# Patient Record
Sex: FEMALE | Race: WHITE | Employment: FULL TIME | URBAN - METROPOLITAN AREA
[De-identification: names, ages, dates, MRNs, and addresses within clinical notes are randomized per-mention and may not be internally consistent; named-entity substitution may affect disease eponyms.]

---

## 2019-01-07 ENCOUNTER — HOSPITAL ENCOUNTER (EMERGENCY)
Age: 29
Discharge: HOME | End: 2019-01-07
Attending: EMERGENCY MEDICINE
Payer: COMMERCIAL

## 2019-01-07 VITALS
HEART RATE: 93 BPM | TEMPERATURE: 98.4 F | DIASTOLIC BLOOD PRESSURE: 63 MMHG | HEIGHT: 70 IN | RESPIRATION RATE: 16 BRPM | OXYGEN SATURATION: 99 % | SYSTOLIC BLOOD PRESSURE: 101 MMHG | BODY MASS INDEX: 27.92 KG/M2 | WEIGHT: 195 LBS

## 2019-01-07 DIAGNOSIS — R55 NEAR SYNCOPE: Primary | ICD-10-CM

## 2019-01-07 DIAGNOSIS — Z3A.21 21 WEEKS GESTATION OF PREGNANCY: ICD-10-CM

## 2019-01-07 DIAGNOSIS — N30.00 ACUTE CYSTITIS WITHOUT HEMATURIA: ICD-10-CM

## 2019-01-07 LAB
ANION GAP SERPL CALCULATED.3IONS-SCNC: 11 MMOL/L (ref 7–16)
BACTERIA: ABNORMAL /HPF
BASOPHILS ABSOLUTE: 0.03 E9/L (ref 0–0.2)
BASOPHILS RELATIVE PERCENT: 0.2 % (ref 0–2)
BILIRUBIN URINE: NEGATIVE
BLOOD, URINE: NEGATIVE
BUN BLDV-MCNC: 10 MG/DL (ref 6–20)
CALCIUM SERPL-MCNC: 9.1 MG/DL (ref 8.6–10.2)
CHLORIDE BLD-SCNC: 101 MMOL/L (ref 98–107)
CLARITY: CLEAR
CO2: 23 MMOL/L (ref 22–29)
COLOR: YELLOW
CREAT SERPL-MCNC: 0.6 MG/DL (ref 0.5–1)
EKG ATRIAL RATE: 90 BPM
EKG P AXIS: 44 DEGREES
EKG P-R INTERVAL: 140 MS
EKG Q-T INTERVAL: 352 MS
EKG QRS DURATION: 80 MS
EKG QTC CALCULATION (BAZETT): 430 MS
EKG R AXIS: 47 DEGREES
EKG T AXIS: 31 DEGREES
EKG VENTRICULAR RATE: 90 BPM
EOSINOPHILS ABSOLUTE: 0.07 E9/L (ref 0.05–0.5)
EOSINOPHILS RELATIVE PERCENT: 0.6 % (ref 0–6)
EPITHELIAL CELLS, UA: ABNORMAL /HPF
GFR AFRICAN AMERICAN: >60
GFR NON-AFRICAN AMERICAN: >60 ML/MIN/1.73
GLUCOSE BLD-MCNC: 86 MG/DL (ref 74–99)
GLUCOSE URINE: NEGATIVE MG/DL
HCT VFR BLD CALC: 35.9 % (ref 34–48)
HEMOGLOBIN: 11.6 G/DL (ref 11.5–15.5)
IMMATURE GRANULOCYTES #: 0.19 E9/L
IMMATURE GRANULOCYTES %: 1.6 % (ref 0–5)
KETONES, URINE: NEGATIVE MG/DL
LACTIC ACID: 0.9 MMOL/L (ref 0.5–2.2)
LEUKOCYTE ESTERASE, URINE: ABNORMAL
LYMPHOCYTES ABSOLUTE: 1.66 E9/L (ref 1.5–4)
LYMPHOCYTES RELATIVE PERCENT: 13.6 % (ref 20–42)
MCH RBC QN AUTO: 30.1 PG (ref 26–35)
MCHC RBC AUTO-ENTMCNC: 32.3 % (ref 32–34.5)
MCV RBC AUTO: 93 FL (ref 80–99.9)
MONOCYTES ABSOLUTE: 0.69 E9/L (ref 0.1–0.95)
MONOCYTES RELATIVE PERCENT: 5.6 % (ref 2–12)
NEUTROPHILS ABSOLUTE: 9.61 E9/L (ref 1.8–7.3)
NEUTROPHILS RELATIVE PERCENT: 78.4 % (ref 43–80)
NITRITE, URINE: NEGATIVE
PDW BLD-RTO: 12 FL (ref 11.5–15)
PH UA: 7 (ref 5–9)
PLATELET # BLD: 247 E9/L (ref 130–450)
PMV BLD AUTO: 8.9 FL (ref 7–12)
POTASSIUM SERPL-SCNC: 4.5 MMOL/L (ref 3.5–5)
PROTEIN UA: NEGATIVE MG/DL
RBC # BLD: 3.86 E12/L (ref 3.5–5.5)
RBC UA: ABNORMAL /HPF (ref 0–2)
SODIUM BLD-SCNC: 135 MMOL/L (ref 132–146)
SPECIFIC GRAVITY UA: <=1.005 (ref 1–1.03)
UROBILINOGEN, URINE: 0.2 E.U./DL
WBC # BLD: 12.3 E9/L (ref 4.5–11.5)
WBC UA: ABNORMAL /HPF (ref 0–5)

## 2019-01-07 PROCEDURE — 99284 EMERGENCY DEPT VISIT MOD MDM: CPT

## 2019-01-07 PROCEDURE — 85025 COMPLETE CBC W/AUTO DIFF WBC: CPT

## 2019-01-07 PROCEDURE — 93005 ELECTROCARDIOGRAM TRACING: CPT | Performed by: PHYSICIAN ASSISTANT

## 2019-01-07 PROCEDURE — 80048 BASIC METABOLIC PNL TOTAL CA: CPT

## 2019-01-07 PROCEDURE — 83605 ASSAY OF LACTIC ACID: CPT

## 2019-01-07 PROCEDURE — 81001 URINALYSIS AUTO W/SCOPE: CPT

## 2019-01-07 PROCEDURE — 2580000003 HC RX 258: Performed by: PHYSICIAN ASSISTANT

## 2019-01-07 RX ORDER — CEFDINIR 300 MG/1
300 CAPSULE ORAL 2 TIMES DAILY
Qty: 14 CAPSULE | Refills: 0 | Status: SHIPPED | OUTPATIENT
Start: 2019-01-07 | End: 2019-01-14

## 2019-01-07 RX ORDER — 0.9 % SODIUM CHLORIDE 0.9 %
1000 INTRAVENOUS SOLUTION INTRAVENOUS ONCE
Status: COMPLETED | OUTPATIENT
Start: 2019-01-07 | End: 2019-01-07

## 2019-01-07 RX ORDER — MECLIZINE HYDROCHLORIDE 25 MG/1
25 TABLET ORAL 3 TIMES DAILY PRN
Qty: 30 TABLET | Refills: 0 | Status: SHIPPED | OUTPATIENT
Start: 2019-01-07 | End: 2019-01-17

## 2019-01-07 RX ADMIN — SODIUM CHLORIDE 1000 ML: 9 INJECTION, SOLUTION INTRAVENOUS at 11:08

## 2019-04-24 ENCOUNTER — HOSPITAL ENCOUNTER (INPATIENT)
Age: 29
LOS: 2 days | Discharge: HOME | DRG: 832 | End: 2019-04-26
Attending: SPECIALIST | Admitting: SPECIALIST
Payer: COMMERCIAL

## 2019-04-24 ENCOUNTER — APPOINTMENT (OUTPATIENT)
Dept: ULTRASOUND IMAGING | Age: 29
DRG: 832 | End: 2019-04-24
Payer: COMMERCIAL

## 2019-04-24 PROBLEM — O21.9 VOMITING AFFECTING PREGNANCY, ANTEPARTUM: Status: ACTIVE | Noted: 2019-04-24

## 2019-04-24 LAB
ALBUMIN SERPL-MCNC: 3.6 G/DL (ref 3.5–5.2)
ALP BLD-CCNC: 102 U/L (ref 35–104)
ALT SERPL-CCNC: 13 U/L (ref 0–32)
AMNISURE PATIENT RESULT: NORMAL
AMORPHOUS: ABNORMAL
ANION GAP SERPL CALCULATED.3IONS-SCNC: 12 MMOL/L (ref 7–16)
AST SERPL-CCNC: 23 U/L (ref 0–31)
BACTERIA: ABNORMAL /HPF
BACTERIA: ABNORMAL /HPF
BILIRUB SERPL-MCNC: <0.2 MG/DL (ref 0–1.2)
BILIRUBIN URINE: NEGATIVE
BILIRUBIN URINE: NEGATIVE
BLOOD, URINE: ABNORMAL
BLOOD, URINE: NEGATIVE
BUN BLDV-MCNC: 9 MG/DL (ref 6–20)
CALCIUM SERPL-MCNC: 8.9 MG/DL (ref 8.6–10.2)
CHLORIDE BLD-SCNC: 102 MMOL/L (ref 98–107)
CLARITY: ABNORMAL
CLARITY: ABNORMAL
CO2: 22 MMOL/L (ref 22–29)
COLOR: YELLOW
COLOR: YELLOW
CREAT SERPL-MCNC: 0.6 MG/DL (ref 0.5–1)
EPITHELIAL CELLS, UA: ABNORMAL /HPF
EPITHELIAL CELLS, UA: ABNORMAL /HPF
GFR AFRICAN AMERICAN: >60
GFR NON-AFRICAN AMERICAN: >60 ML/MIN/1.73
GLUCOSE BLD-MCNC: 89 MG/DL (ref 74–99)
GLUCOSE URINE: NEGATIVE MG/DL
GLUCOSE URINE: NEGATIVE MG/DL
HCT VFR BLD CALC: 33.7 % (ref 34–48)
HEMOGLOBIN: 11.4 G/DL (ref 11.5–15.5)
KETONES, URINE: ABNORMAL MG/DL
KETONES, URINE: NEGATIVE MG/DL
LEUKOCYTE ESTERASE, URINE: ABNORMAL
LEUKOCYTE ESTERASE, URINE: NEGATIVE
MCH RBC QN AUTO: 31.1 PG (ref 26–35)
MCHC RBC AUTO-ENTMCNC: 33.8 % (ref 32–34.5)
MCV RBC AUTO: 92.1 FL (ref 80–99.9)
MUCUS: PRESENT
MUCUS: PRESENT
NITRITE, URINE: NEGATIVE
NITRITE, URINE: NEGATIVE
PDW BLD-RTO: 12 FL (ref 11.5–15)
PH UA: 6.5 (ref 5–9)
PH UA: 7.5 (ref 5–9)
PLATELET # BLD: 200 E9/L (ref 130–450)
PMV BLD AUTO: 9.5 FL (ref 7–12)
POTASSIUM SERPL-SCNC: 3.8 MMOL/L (ref 3.5–5)
PROTEIN UA: NEGATIVE MG/DL
PROTEIN UA: NEGATIVE MG/DL
RBC # BLD: 3.66 E12/L (ref 3.5–5.5)
RBC UA: >20 /HPF (ref 0–2)
RBC UA: ABNORMAL /HPF (ref 0–2)
RENAL EPITHELIAL, UA: ABNORMAL /HPF
SODIUM BLD-SCNC: 136 MMOL/L (ref 132–146)
SPECIFIC GRAVITY UA: 1.01 (ref 1–1.03)
SPECIFIC GRAVITY UA: 1.02 (ref 1–1.03)
TOTAL PROTEIN: 6.9 G/DL (ref 6.4–8.3)
UROBILINOGEN, URINE: 0.2 E.U./DL
UROBILINOGEN, URINE: 0.2 E.U./DL
WBC # BLD: 15.2 E9/L (ref 4.5–11.5)
WBC UA: ABNORMAL /HPF (ref 0–5)
WBC UA: ABNORMAL /HPF (ref 0–5)

## 2019-04-24 PROCEDURE — 6360000002 HC RX W HCPCS: Performed by: SPECIALIST

## 2019-04-24 PROCEDURE — 87186 SC STD MICRODIL/AGAR DIL: CPT

## 2019-04-24 PROCEDURE — 85027 COMPLETE CBC AUTOMATED: CPT

## 2019-04-24 PROCEDURE — 2580000003 HC RX 258: Performed by: NURSE PRACTITIONER

## 2019-04-24 PROCEDURE — 87077 CULTURE AEROBIC IDENTIFY: CPT

## 2019-04-24 PROCEDURE — 36415 COLL VENOUS BLD VENIPUNCTURE: CPT

## 2019-04-24 PROCEDURE — 99213 OFFICE O/P EST LOW 20 MIN: CPT | Performed by: NURSE PRACTITIONER

## 2019-04-24 PROCEDURE — 2580000003 HC RX 258: Performed by: SPECIALIST

## 2019-04-24 PROCEDURE — 87088 URINE BACTERIA CULTURE: CPT

## 2019-04-24 PROCEDURE — 80053 COMPREHEN METABOLIC PANEL: CPT

## 2019-04-24 PROCEDURE — 76770 US EXAM ABDO BACK WALL COMP: CPT

## 2019-04-24 PROCEDURE — 51701 INSERT BLADDER CATHETER: CPT

## 2019-04-24 PROCEDURE — 81001 URINALYSIS AUTO W/SCOPE: CPT

## 2019-04-24 RX ORDER — SODIUM CHLORIDE, SODIUM LACTATE, POTASSIUM CHLORIDE, AND CALCIUM CHLORIDE .6; .31; .03; .02 G/100ML; G/100ML; G/100ML; G/100ML
500 INJECTION, SOLUTION INTRAVENOUS ONCE
Status: COMPLETED | OUTPATIENT
Start: 2019-04-24 | End: 2019-04-24

## 2019-04-24 RX ORDER — SODIUM CHLORIDE, SODIUM LACTATE, POTASSIUM CHLORIDE, CALCIUM CHLORIDE 600; 310; 30; 20 MG/100ML; MG/100ML; MG/100ML; MG/100ML
INJECTION, SOLUTION INTRAVENOUS CONTINUOUS
Status: DISCONTINUED | OUTPATIENT
Start: 2019-04-24 | End: 2019-04-26 | Stop reason: HOSPADM

## 2019-04-24 RX ADMIN — SODIUM CHLORIDE, POTASSIUM CHLORIDE, SODIUM LACTATE AND CALCIUM CHLORIDE 500 ML: 600; 310; 30; 20 INJECTION, SOLUTION INTRAVENOUS at 14:55

## 2019-04-24 RX ADMIN — SODIUM CHLORIDE, POTASSIUM CHLORIDE, SODIUM LACTATE AND CALCIUM CHLORIDE: 600; 310; 30; 20 INJECTION, SOLUTION INTRAVENOUS at 19:05

## 2019-04-24 RX ADMIN — CEFTRIAXONE 1 G: 1 INJECTION, POWDER, FOR SOLUTION INTRAMUSCULAR; INTRAVENOUS at 21:53

## 2019-04-24 NOTE — PROGRESS NOTES
Helped patient up to restroom. Blood tinged on toilet tissue and in urine. Gave patient pad to monitor if bleeding continues from VE.

## 2019-04-24 NOTE — PROGRESS NOTES
Dr. Sanchez Sanford notified pt wants to eat, and notified that the ultrasound of the kidneys is still not back, new orders obtained

## 2019-04-24 NOTE — H&P
Department of Obstetrics and Gynecology  Labor and Delivery  Triage Note      SUBJECTIVE:  Roman Arriaza is a 34 y.o. female, , No LMP recorded. Patient is pregnant., Estimated Date of Delivery: 19, 36w2d, here with the complaint of decreased fm, lof, n/v since this morning. Pt states she had an appointment with dr Belkis carballo this afternoon or an NST which was reactive. Pt reports an episode of emesis every time she eats something. She has been able to keep water down. She reports mid-lower back pain. Denies dysuria, frequency, or urgency. Pt denies constipation.       Prenatal course: threatened  labor at 27 weeks    Review of Systems:   Ears, nose, mouth, throat, and face: negative  Respiratory: negative  Cardiovascular: negative  Gastrointestinal: positive for abdominal pain and vomiting  Genitourinary:positive for cva tenderness,    Integument/breast: negative  Hematologic/lymphatic: negative  Musculoskeletal:negative  Neurological: negative  Behavioral/Psych: negative  Endocrine: negative  Allergic/Immunologic: negative    OBJECTIVE    Vitals:  /74   Pulse 97   Temp 98.7 °F (37.1 °C) (Oral)   Resp 16   Ht 5' 10\" (1.778 m)   Wt 233 lb (105.7 kg)   BMI 33.43 kg/m²     General- alert, NAD  Skin- warm and dry, no rashes seen  Psych- normal mood and affect  Neuro- CN II-XII grossly intact  Abdomen: soft, nontender  Musculoskeletal-cva tenderness, low-mid back pain    Vaginal Exam:  Cervical dilatation: 1cm, Effacement: 60%, Station: -2, Presentation: vtx, Membranes: intact    Fetal heart rate:         Baseline Heart Rate:  140        Accelerations:  present       Decelerations:  absent       Variability:  moderate    Contraction frequency: none    ASSESSMENT:      36.2 weeks  N/v  Back pain  amnisure negative    Plan: d/w Dr. Karlene Good  Fetal monitoring  Urinalysis  Hydrate  Cbc   cmp

## 2019-04-25 PROBLEM — O23.03 PYELONEPHRITIS AFFECTING PREGNANCY IN THIRD TRIMESTER: Status: ACTIVE | Noted: 2019-04-25

## 2019-04-25 PROBLEM — R07.9 CHEST PAIN: Status: ACTIVE | Noted: 2019-04-25

## 2019-04-25 PROBLEM — M54.9 BACK PAIN: Status: ACTIVE | Noted: 2019-04-25

## 2019-04-25 LAB
D DIMER: 225 NG/ML DDU
TROPONIN: <0.01 NG/ML (ref 0–0.03)

## 2019-04-25 PROCEDURE — 6360000002 HC RX W HCPCS: Performed by: SPECIALIST

## 2019-04-25 PROCEDURE — 84484 ASSAY OF TROPONIN QUANT: CPT

## 2019-04-25 PROCEDURE — 85378 FIBRIN DEGRADE SEMIQUANT: CPT

## 2019-04-25 PROCEDURE — 2580000003 HC RX 258: Performed by: SPECIALIST

## 2019-04-25 PROCEDURE — 36415 COLL VENOUS BLD VENIPUNCTURE: CPT

## 2019-04-25 PROCEDURE — 99254 IP/OBS CNSLTJ NEW/EST MOD 60: CPT | Performed by: INTERNAL MEDICINE

## 2019-04-25 PROCEDURE — 2580000003 HC RX 258: Performed by: NURSE PRACTITIONER

## 2019-04-25 PROCEDURE — 93005 ELECTROCARDIOGRAM TRACING: CPT | Performed by: INTERNAL MEDICINE

## 2019-04-25 PROCEDURE — 1220000000 HC SEMI PRIVATE OB R&B

## 2019-04-25 RX ORDER — CEFTRIAXONE 1 G/1
INJECTION, POWDER, FOR SOLUTION INTRAMUSCULAR; INTRAVENOUS
Status: DISCONTINUED
Start: 2019-04-25 | End: 2019-04-25 | Stop reason: WASHOUT

## 2019-04-25 RX ADMIN — SODIUM CHLORIDE, POTASSIUM CHLORIDE, SODIUM LACTATE AND CALCIUM CHLORIDE: 600; 310; 30; 20 INJECTION, SOLUTION INTRAVENOUS at 11:37

## 2019-04-25 RX ADMIN — SODIUM CHLORIDE, POTASSIUM CHLORIDE, SODIUM LACTATE AND CALCIUM CHLORIDE: 600; 310; 30; 20 INJECTION, SOLUTION INTRAVENOUS at 19:20

## 2019-04-25 RX ADMIN — CEFTRIAXONE 1 G: 1 INJECTION, POWDER, FOR SOLUTION INTRAMUSCULAR; INTRAVENOUS at 21:39

## 2019-04-25 NOTE — PROGRESS NOTES
Patient still c/o abdominal cramping and back pain. VE unchanged and no blood on glove. Will update doctor.

## 2019-04-25 NOTE — PROGRESS NOTES
Jeff Woods notified of urology recommendations, states she will stay and get another antibiotic tonight with possible discharge after second dose of antibiotic

## 2019-04-25 NOTE — PROGRESS NOTES
Dr Elana Nelson updated on straight cath results. Orders to give rocephin now, (even with allergy of hives to pcn) and strain urine.   Will re-evaluate in AM.

## 2019-04-25 NOTE — PROGRESS NOTES
Dr. Mark Orr notified of new consult, no new orders obtained and states he will be up to evaluate the pt

## 2019-04-25 NOTE — CONSULTS
4/25/2019 7:13 AM  Service: Urology  Group: [email or web address] ([facility])    Graciela Serrano  [id number]     Chief Complaint:    Bilateral back pain    History of Present Illness: The patient is a 34 y.o. female patient whoavida 1 para 0 abortus 0. Her estimated date of delivery is 5-. She had an episode of emesis and some mid lower back pain. She is afebrile. Her BUN is 9 and creatinine is 0.6. Her white count is 15,200. Her urine shows a large amount of blood. Her renal ultrasound is normal showing no hydronephrosis not even hydronephrosis of pregnancy. .  When she was a child she had a dilatation of her bladder which resolved some lower urinary tract symptoms that she had. She's had no recent problems until last week she was treated for urinary tract infection. She presented with the right flank but also iliosacral pain and had 1 episode of emesis without associated nausea. There was no fever. .  She is a nonsmoker is on no diuretics. A catheterized specimen did show her to have microhematuria. She is not had gross hematuria. There is no family history of calculus disease. Up to this point to her pregnancy has been uncomplicated      Past Medical History:   Diagnosis Date    Abdominal pain     Anxiety     Esophageal spasm     Genital herpes 12/17/2014    as of 11/25/2015 / no active breakout    GERD (gastroesophageal reflux disease)     History of PCOS     Hypogonadotropic hypogonadism syndrome, female ([facility] [address] 12/17/2014    Exercise induced. Past Surgical History:   Procedure Laterality Date    BLADDER SURGERY      ENDOSCOPY, COLON, DIAGNOSTIC  11/25/2015       Medications Prior to Admission:    Medications Prior to Admission: Prenatal Vit w/Gr-Wihcsnybz-LS (PNV PO), Take by mouth  valACYclovir (VALTREX) 500 MG tablet, Take 500 mg by mouth daily as needed. Allergies:    Food;  Bactrim [sulfamethoxazole-trimethoprim]; Pcn [penicillins]; and Doxycycline    Social History:    reports that she has never smoked. She has never used smokeless tobacco. She reports that she does not drink alcohol or use drugs. She lives at home with her . Family History:   Non-contributory to this Urological problem  family history is not on file. Review of Systems:  Respiratory: negative for cough and hemoptysis  Cardiovascular: negative for chest pain and dyspnea  Gastrointestinal: negative for abdominal pain, diarrhea, nausea and vomiting  Derm: negative for rash and skin lesion(s)  Neurological: negative for seizures and tremors  Endocrine: negative for diabetic symptoms including polydipsia and polyuria  : As above in the HPI, otherwise negative  All other reviews are negative    Physical Exam:     Vitals:  /63   Pulse 82   Temp 98.3 °F (36.8 °C) (Oral)   Resp 16   Ht 5' 10\" (1.778 m)   Wt 233 lb (105.7 kg)   BMI 33.43 kg/m²     General:  Awake, alert, oriented X 3. Well developed, well nourished, well groomed. No apparent distress. HEENT:  Normocephalic, atraumatic. Pupils equal, round. No scleral icterus. No conjunctival injection. Normal lips, teeth, and gums. No nasal discharge. Neck:  Supple, no masses. Heart:  RRR  Lungs:  No audible wheezing. Respirations symmetric and non-labored. Abdomen:  soft, nontender, no masses, no organomegaly, no peritoneal signs  Extremities:  No clubbing, cyanosis, or edema  Skin:  Warm and dry, no open lesions or rashes  Neuro: There are no motor or sensory deficits in the 4 quadrant extremities   Rectal: deferred  Genitalia:      Labs:     Recent Labs     04/24/19  1450   WBC 15.2*   RBC 3.66   HGB 11.4*   HCT 33.7*   MCV 92.1   MCH 31.1   MCHC 33.8   RDW 12.0      MPV 9.5         Recent Labs     04/24/19  1450   CREATININE 0.6         Assessment:  Alix Bobbi 34 y.o. female     Her renal ultrasound is normal.  Her microhematuria could be a remnant of her recent urinary tract infection.   She is comfortable at the moment she could be discharged from our standpoint followed in our office in the course of the next several weeks. I would ask her to see nurse practitioner Kalie Morrissey at her next office visit subsequently she'll be seen by one of our physicians. Plan:  If she has interval fever or worsening of her flank pain she may need reassessed at present will I would like to avoid cystoscopy and stent insertion. Metabolic evaluation may be necessary if we determine that she's had a stone.   I would like further imaging of her when her pregnancy is completed including perhaps a CAT scan of abdomen and pelvis without contrast masses sure there is no calculus disease  Cystoscopy may be necessary in the future particularly if she has persistent microhematuria    Electronically signed by Misael Cali MD on 4/25/2019 at 7:13 AM

## 2019-04-25 NOTE — PROGRESS NOTES
Pt called out complaining of CP. Pt states it is on the right side and wraps around her chest to her back. Dr. Yap Gravely happened to be on the unit and new orders obtained.   Vitals stable

## 2019-04-25 NOTE — PROGRESS NOTES
Dr Oquendo Public updated on patient VE, spotting, ctxs, continues back pain and abd cramping, Ultrasound WNL, UA abnormal.  Orders for Rocephin, try to obtain straight cath or consult nephrology in AM.

## 2019-04-25 NOTE — CONSULTS
[facility] IM consult        Chief Complaint:  R ant Chest resolved now R mid back pain  History of Present Illness   The patient is a 34 y.o. female    First pregnacy -- date of delivery 5/20/19  S/p recurrent emesis, midlow back pain, inc WBC, suspected UTI. Pt reports an episode of emesis every time she eats something. She has been able to keep water down. She reports mid-lower back pain. Denies dysuria, frequency, or urgency. Seen by renal for-- hematuria on urine  \"Her renal ultrasound is normal.  Her microhematuria could be a remnant of her recent urinary tract infection. \"    Back pain over days   theon ONE episode few mintues She reportedly had R sternal chest pain, earlier today-- no new sob   HR [address] -- for this stage pregnancy  No wheezing, no new cough, or chest tigthness  Not pleuritic, no external, no hx of angina, no chf symptoms  No changed by chest wall motion, breathing, arm motion-- unable to reproduce chest pain now  Hx of gerds,but she feels this is different    - hx taken from the   REVIEW OF SYSTEMS:  no fevers, chills, cp, sob, n/v, ha, vision/hearing changes, wt changes, hot/cold flashes, other open skin lesions, diarrhea, constipation, dysuria/hematuria unless noted in HPI. Complete ROS performed with the patient and is otherwise negative. Past Medical History:      Diagnosis Date    Abdominal pain     Anxiety     Esophageal spasm     Genital herpes 12/17/2014    as of 11/25/2015 / no active breakout    GERD (gastroesophageal reflux disease)     History of PCOS     Hypogonadotropic hypogonadism syndrome, female ([facility] [address] 12/17/2014    Exercise induced. Past Surgical History:        Procedure Laterality Date    BLADDER SURGERY      ENDOSCOPY, COLON, DIAGNOSTIC  11/25/2015       Home Medications:  Prior to Admission medications    Medication Sig Start Date End Date Taking?  Authorizing Provider   Prenatal Vit w/Gi-Jrdlrlptb-TI (PNV PO) Take by mouth   Yes Historical Provider, MD   valACYclovir (VALTREX) 500 MG tablet Take 500 mg by mouth daily as needed. Historical Provider, MD       Allergies:  Food; Bactrim [sulfamethoxazole-trimethoprim]; Pcn [penicillins]; and Doxycycline    Social History:   TOBACCO:   reports that she has never smoked. She has never used smokeless tobacco.  ETOH:   reports that she does not drink alcohol. Family History:   History reviewed. No pertinent family history. Or deferred/otherwise considered non contributory to current admission  PHYSICAL EXAM:    VS: /72   Pulse 85   Temp 97.9 °F (36.6 °C) (Oral)   Resp 16   Ht 5' 10\" (1.778 m)   Wt 233 lb (105.7 kg)   SpO2 99%   BMI 33.43 kg/m²     General Appearance:     no acute distress. Psych:  HEENT:    A.O. As per HPI details  NC/AT, PERRL, no pallor no icterus, lips/ext mucous membrane grossly N    Neck:   Supple, trachea midline, no obvious JVD   Resp:     CTAB, No wheezes, No rhonchi   Chest wall:    No tenderness or deformity   Heart:    Regular rate and rhythm, S1 and S2 normal, no rub or gallop. Abdomen:     Soft, non-tender, bowel sounds active    Large stomach consistent with 37 week+pregnancy    Genitalia & Rectal:    Deferred.    Extremities x4:   Extremities normal, atraumatic, no cyanosis, no clubbing   Musculoskeletal:      NO active synovitis or swollen b/l wrists, 2-5 MCPs examined   Skin:   Skin color, texture, turgor fairly normal, no ACUTE rashes or lesions in lower legs and arms examined   Lymph nodes:   Cervical nodes grossly normal   Neurologic:  .Grossly symmetric  strength in UEs and LEs with symmetric grossly intact to light touch sensation     LABS:  CBC:   Lab Results   Component Value Date    WBC 15.2 04/24/2019    RBC 3.66 04/24/2019    HGB 11.4 04/24/2019    HCT 33.7 04/24/2019     04/24/2019    MCV 92.1 04/24/2019     BMP:    Lab Results   Component Value Date     04/24/2019    K 3.8 04/24/2019     04/24/2019 CO2 22 2019    BUN 9 2019    CREATININE 0.6 2019    GLUCOSE 89 2019    CALCIUM 8.9 2019     Hepatic Function Panel:    Lab Results   Component Value Date    ALKPHOS 102 2019    AST 23 2019    ALT 13 2019    PROT 6.9 2019    LABALBU 3.6 2019    BILITOT <0.2 2019     Magnesium:  No results found for: MG    PT/INR:  No results found for: PROTIME, INR  U/A:   Lab Results   Component Value Date    LEUKOCYTESUR Negative 2019    PHUR 6.5 2019    WBCUA 2-5 2019    RBCUA >20 2019    BACTERIA MODERATE 2019    SPECGRAV 1.020 2019    BLOODU LARGE 2019    GLUCOSEU Negative 2019     ABG:  No results found for: PHART, ZHK0CEO, PO2ART, N2PTEFHF, YXQ1FEN, BEART  TSH:  No results found for: TSH  Cardiac Enzymes: No results found for: CKTOTAL, CKMB, CKMBINDEX, TROPONINI    Radiology: Us Retroperitoneal Complete    Result Date: 2019  Patient MRN:  [id number] : 1990 Age: 34 years Gender: Female Order Date:  2019 4:45 PM EXAM: US RETROPERITONEAL COMPLETE NUMBER OF IMAGES:  40 INDICATION:  cva tenderness  COMPARISON: None TECHNIQUE: Real-time ultrasound examination of  the kidneys and urinary bladder was performed. Gated and color Doppler techniques were used were appropriate. FINDINGS: The patient is gravid with a third trimester fetus in cephalic presentation. The kidneys are normal in size and texture. The right kidney measures 9.7 x 4.0 x 5.0 cm and the left kidney measures 11.2 x 3.4 x 5.7 cm. No renal masses, hydronephrosis or shadowing calculi are seen. No perinephric fluid collections are identified. The urinary bladder is decompressed and not clearly visualized. CONCLUSION: 1. Normal renal ultrasound with no evidence of hydronephrosis. 2. Third trimester intrauterine gestation seen in cephalic presentation.        EKG:  Done at my request -- normal   Assessment & Plan   ACTIVE hospital problems being addressed/reassessed for this admission:  Active Problems:    Vomiting affecting pregnancy, antepartum    Pyelonephritis affecting pregnancy in third trimester  Resolved Problems:    * No resolved hospital problems. *    Code status/DVT prophylaxis and PLAN --see orders   Note extensive time spent coordinating care between ER docs, ER and floor nurses, and transitioning care over to day providers  Plan of care/ clinical impressions/communication specifics detailed below:    R ant Chest resolved now R mid back pain   34 y.o. female    First pregnacy -- date of delivery 5/20/19  S/p recurrent emesis, midlow back pain, inc WBC, suspected UTI. Pt reports an episode of emesis every time she eats something. She has been able to keep water down. She reports mid-lower back pain. Denies dysuria, frequency, or urgency. Seen by renal for-- hematuria on urine  \"Her renal ultrasound is normal.  Her microhematuria could be a remnant of her recent urinary tract infection. \"    Back pain over days --not tearing like aortic dissection  theon ONE episode few mintues She reportedly had R sternal chest pain, earlier today-- no new sob   HR [address] -- for this stage pregnancy  No wheezing, no new cough, or chest tigthness  Not pleuritic, no external, no hx of angina, no chf symptoms  No changed by chest wall motion, breathing, arm motion-- unable to reproduce chest pain now  Hx of gerds,but she feels this is different      R chest wall pain, NOT reproducible with chest wall maneuvers. ekg ordered by me-- reviewed N- doubt anginal  I requested trop --addendum trop N  ddimer <300, not suspicious for PE or acute dissection    Back pain, no major costovertebral angle tenderness- if previously assoc with pyelo/UTI-- now improved s/p dose abx earlier- if related to vomiting and wretching back/chest wall--now seemingly improving  No recurrence of chest pain, resolved  IM consult sign off.      Yony Cintron MD   Night Officer, overnight admitting doctor at [facility] call day time doctor   for questions after 7:30am    Covering for [address] [facility]  If Qs please call [phone]  Electronically signed by Cindy Rodriguez MD on 4/25/2019 at 7:26 PM

## 2019-04-26 VITALS
RESPIRATION RATE: 15 BRPM | HEIGHT: 70 IN | DIASTOLIC BLOOD PRESSURE: 67 MMHG | WEIGHT: 233 LBS | SYSTOLIC BLOOD PRESSURE: 116 MMHG | TEMPERATURE: 98 F | OXYGEN SATURATION: 99 % | HEART RATE: 80 BPM | BODY MASS INDEX: 33.36 KG/M2

## 2019-04-26 LAB
LV EF: 65 %
LVEF MODALITY: NORMAL

## 2019-04-26 PROCEDURE — 93306 TTE W/DOPPLER COMPLETE: CPT

## 2019-04-26 RX ORDER — CEFUROXIME AXETIL 250 MG/1
250 TABLET ORAL 2 TIMES DAILY
Qty: 20 TABLET | Refills: 0 | Status: SHIPPED | OUTPATIENT
Start: 2019-04-26 | End: 2019-05-06

## 2019-04-26 NOTE — PROGRESS NOTES
Patient feeling better today. Seen by pulmonology, and diagnosed with costochondritis.   Abdomen: Soft, nontender  Uterus: Soft, nontender, gravid  Back: no CVA tenderness  Extremities: Nontender, no Homans    Assessment:  1-pyelonephritis  2-right-sided chest pain-costochondritis    Plan:  1-discharge to home with oral antibiotics

## 2019-04-26 NOTE — PROGRESS NOTES
Dr. Farshad Ferguson called and notified of D-dimer and troponin results. Stated he didn't feel it was suspicious and woudn't hold up any discharge. Have pt follow up with next appt.

## 2019-04-26 NOTE — PROGRESS NOTES
Dr. Danette Garcia notified of pt status and that Dr. Álvaro León would not hold up any discharge. . Stated he would be in to see her.

## 2019-04-26 NOTE — PROGRESS NOTES
Patient complaining of pain in her right chest radiating around to her back ×3 hours. She was evaluated by medicine who did not feel it was cardiac in nature. She denies shortness of breath, cough, or other complaints. She does admit to improvement in her CVA tenderness.   Vital signs stable, afebrile  Abdomen: Soft, nontender  Back: No CVA tenderness  Extremities: Nontender, no Homans    Assessment:  1-pyelonephritis  2-right-sided chest pain    Plan:  1-continue antibiotics  2-medicine consult completed  3-pulmonology consult

## 2019-04-26 NOTE — PROGRESS NOTES
Dr Aguirre Res called, stating Dr Angel Espinoza to see pt today.   Dr Angel Espinoza placed on patients treatment team.. Patient taken to echo via cart

## 2019-04-26 NOTE — PROGRESS NOTES
Patient denies any LOF, Vag bleeding, denies chest pain SPO2 99% on RA. Patient denies any pain at all. Call light in reach, patient understands if anything changes to let her nurse know.

## 2019-04-26 NOTE — CONSULTS
cyanosis. No edema  Neuro: Awake. Follows commands. Positive pupils/gag/corneals. Normal pain response. Lab Results:  CBC:   Recent Labs     04/24/19  1450   WBC 15.2*   HGB 11.4*   HCT 33.7*   MCV 92.1          BMP:  Recent Labs     04/24/19  1450      K 3.8      CO2 22   BUN 9   CREATININE 0.6    ALB:3,BILIDIR:3,BILITOT:3,ALKPHOS:3)@    PT/INR: No results for input(s): PROTIME, INR in the last 72 hours. Cultures:  Sputum: not available  Blood: not available    ABG:   No results for input(s): PH, PO2, PCO2, HCO3, BE, O2SAT, METHB, O2HB, COHB, O2CON, HHB, THB in the last 72 hours. Films:     US RETROPERITONEAL COMPLETE   Final Result      . Assessment:  1. Chest pain: musculoskeletal and reproducible. Pulmonary embolism is effectively exclueded with a d-dimer of 225      Plan:  1. Ok to discharge      Thanks for letting us see this patient in consultation. Please contact us with any questions. Office [phone] or after hours through [employer], x [phone]. Please note that voice recognition technology was used in the preparation of this note and make therefore it may contain inadvertent transcription errors    Vida Arellano M.D., F.C.C.P.     [facility], [address] [place] [address], [address] [place] [address]

## 2019-04-27 LAB — URINE CULTURE, ROUTINE: NORMAL

## 2019-04-28 LAB
EKG ATRIAL RATE: 83 BPM
EKG P AXIS: 23 DEGREES
EKG P-R INTERVAL: 132 MS
EKG Q-T INTERVAL: 354 MS
EKG QRS DURATION: 78 MS
EKG QTC CALCULATION (BAZETT): 415 MS
EKG R AXIS: 41 DEGREES
EKG T AXIS: 15 DEGREES
EKG VENTRICULAR RATE: 83 BPM

## 2019-04-28 PROCEDURE — 93010 ELECTROCARDIOGRAM REPORT: CPT | Performed by: INTERNAL MEDICINE

## 2019-04-29 ENCOUNTER — HOSPITAL ENCOUNTER (INPATIENT)
Age: 29
LOS: 4 days | Discharge: HOME | End: 2019-05-03
Attending: SPECIALIST | Admitting: SPECIALIST
Payer: COMMERCIAL

## 2019-04-29 PROBLEM — O14.93 PREECLAMPSIA, THIRD TRIMESTER: Status: ACTIVE | Noted: 2019-04-29

## 2019-04-29 PROBLEM — Z3A.37 37 WEEKS GESTATION OF PREGNANCY: Status: ACTIVE | Noted: 2019-04-29

## 2019-04-29 LAB
ALBUMIN SERPL-MCNC: 3.6 G/DL (ref 3.5–5.2)
ALP BLD-CCNC: 107 U/L (ref 35–104)
ALT SERPL-CCNC: 13 U/L (ref 0–32)
AMPHETAMINE SCREEN, URINE: NOT DETECTED
ANION GAP SERPL CALCULATED.3IONS-SCNC: 12 MMOL/L (ref 7–16)
AST SERPL-CCNC: 20 U/L (ref 0–31)
BACTERIA: NORMAL /HPF
BARBITURATE SCREEN URINE: NOT DETECTED
BASOPHILS ABSOLUTE: 0.05 E9/L (ref 0–0.2)
BASOPHILS RELATIVE PERCENT: 0.3 % (ref 0–2)
BENZODIAZEPINE SCREEN, URINE: NOT DETECTED
BILIRUB SERPL-MCNC: <0.2 MG/DL (ref 0–1.2)
BILIRUBIN URINE: NEGATIVE
BLOOD, URINE: NEGATIVE
BUN BLDV-MCNC: 8 MG/DL (ref 6–20)
CALCIUM SERPL-MCNC: 9 MG/DL (ref 8.6–10.2)
CANNABINOID SCREEN URINE: NOT DETECTED
CHLORIDE BLD-SCNC: 102 MMOL/L (ref 98–107)
CLARITY: CLEAR
CO2: 22 MMOL/L (ref 22–29)
COCAINE METABOLITE SCREEN URINE: NOT DETECTED
COLOR: YELLOW
CREAT SERPL-MCNC: 0.7 MG/DL (ref 0.5–1)
EOSINOPHILS ABSOLUTE: 0.16 E9/L (ref 0.05–0.5)
EOSINOPHILS RELATIVE PERCENT: 1 % (ref 0–6)
EPITHELIAL CELLS, UA: NORMAL /HPF
GFR AFRICAN AMERICAN: >60
GFR NON-AFRICAN AMERICAN: >60 ML/MIN/1.73
GLUCOSE BLD-MCNC: 77 MG/DL (ref 74–99)
GLUCOSE URINE: NEGATIVE MG/DL
HCT VFR BLD CALC: 35.8 % (ref 34–48)
HEMOGLOBIN: 11.6 G/DL (ref 11.5–15.5)
IMMATURE GRANULOCYTES #: 0.4 E9/L
IMMATURE GRANULOCYTES %: 2.5 % (ref 0–5)
KETONES, URINE: NEGATIVE MG/DL
LEUKOCYTE ESTERASE, URINE: ABNORMAL
LYMPHOCYTES ABSOLUTE: 2.61 E9/L (ref 1.5–4)
LYMPHOCYTES RELATIVE PERCENT: 16.3 % (ref 20–42)
MCH RBC QN AUTO: 30.2 PG (ref 26–35)
MCHC RBC AUTO-ENTMCNC: 32.4 % (ref 32–34.5)
MCV RBC AUTO: 93.2 FL (ref 80–99.9)
METHADONE SCREEN, URINE: NOT DETECTED
MONOCYTES ABSOLUTE: 1.24 E9/L (ref 0.1–0.95)
MONOCYTES RELATIVE PERCENT: 7.8 % (ref 2–12)
NEUTROPHILS ABSOLUTE: 11.54 E9/L (ref 1.8–7.3)
NEUTROPHILS RELATIVE PERCENT: 72.1 % (ref 43–80)
NITRITE, URINE: NEGATIVE
OPIATE SCREEN URINE: NOT DETECTED
PDW BLD-RTO: 12.2 FL (ref 11.5–15)
PH UA: 7 (ref 5–9)
PHENCYCLIDINE SCREEN URINE: NOT DETECTED
PLATELET # BLD: 204 E9/L (ref 130–450)
PMV BLD AUTO: 9.6 FL (ref 7–12)
POTASSIUM SERPL-SCNC: 4 MMOL/L (ref 3.5–5)
PROPOXYPHENE SCREEN: NOT DETECTED
PROTEIN UA: NEGATIVE MG/DL
RBC # BLD: 3.84 E12/L (ref 3.5–5.5)
RBC UA: NORMAL /HPF (ref 0–2)
SODIUM BLD-SCNC: 136 MMOL/L (ref 132–146)
SPECIFIC GRAVITY UA: 1.01 (ref 1–1.03)
TOTAL PROTEIN: 6.9 G/DL (ref 6.4–8.3)
URIC ACID, SERUM: 4.5 MG/DL (ref 2.4–5.7)
UROBILINOGEN, URINE: 0.2 E.U./DL
WBC # BLD: 16 E9/L (ref 4.5–11.5)
WBC UA: NORMAL /HPF (ref 0–5)

## 2019-04-29 PROCEDURE — 80307 DRUG TEST PRSMV CHEM ANLYZR: CPT

## 2019-04-29 PROCEDURE — 6360000002 HC RX W HCPCS

## 2019-04-29 PROCEDURE — 84550 ASSAY OF BLOOD/URIC ACID: CPT

## 2019-04-29 PROCEDURE — 80053 COMPREHEN METABOLIC PANEL: CPT

## 2019-04-29 PROCEDURE — 85025 COMPLETE CBC W/AUTO DIFF WBC: CPT

## 2019-04-29 PROCEDURE — 1220000001 HC SEMI PRIVATE L&D R&B

## 2019-04-29 PROCEDURE — 36415 COLL VENOUS BLD VENIPUNCTURE: CPT

## 2019-04-29 PROCEDURE — 81001 URINALYSIS AUTO W/SCOPE: CPT

## 2019-04-29 PROCEDURE — 99213 OFFICE O/P EST LOW 20 MIN: CPT | Performed by: OBSTETRICS & GYNECOLOGY

## 2019-04-29 RX ORDER — BETAMETHASONE SODIUM PHOSPHATE AND BETAMETHASONE ACETATE 3; 3 MG/ML; MG/ML
12 INJECTION, SUSPENSION INTRA-ARTICULAR; INTRALESIONAL; INTRAMUSCULAR; SOFT TISSUE ONCE
Status: COMPLETED | OUTPATIENT
Start: 2019-04-29 | End: 2019-04-29

## 2019-04-29 RX ORDER — ONDANSETRON 2 MG/ML
4 INJECTION INTRAMUSCULAR; INTRAVENOUS EVERY 6 HOURS PRN
Status: DISCONTINUED | OUTPATIENT
Start: 2019-04-29 | End: 2019-04-30 | Stop reason: HOSPADM

## 2019-04-29 RX ORDER — BETAMETHASONE SODIUM PHOSPHATE AND BETAMETHASONE ACETATE 3; 3 MG/ML; MG/ML
INJECTION, SUSPENSION INTRA-ARTICULAR; INTRALESIONAL; INTRAMUSCULAR; SOFT TISSUE
Status: COMPLETED
Start: 2019-04-29 | End: 2019-04-29

## 2019-04-29 RX ORDER — SODIUM CHLORIDE, SODIUM LACTATE, POTASSIUM CHLORIDE, CALCIUM CHLORIDE 600; 310; 30; 20 MG/100ML; MG/100ML; MG/100ML; MG/100ML
INJECTION, SOLUTION INTRAVENOUS CONTINUOUS
Status: DISCONTINUED | OUTPATIENT
Start: 2019-04-29 | End: 2019-04-30

## 2019-04-29 RX ADMIN — BETAMETHASONE SODIUM PHOSPHATE AND BETAMETHASONE ACETATE 12 MG: 3; 3 INJECTION, SUSPENSION INTRA-ARTICULAR; INTRALESIONAL; INTRAMUSCULAR; SOFT TISSUE at 22:51

## 2019-04-29 RX ADMIN — BETAMETHASONE SODIUM PHOSPHATE AND BETAMETHASONE ACETATE 12 MG: 3; 3 INJECTION, SUSPENSION INTRA-ARTICULAR; INTRALESIONAL; INTRAMUSCULAR at 22:51

## 2019-04-29 NOTE — PROGRESS NOTES
Department of Obstetrics and Gynecology  Labor and Delivery  Triage Note      SUBJECTIVE:  Denisse Taylor is a 34 y.o. female, , No LMP recorded. Patient is pregnant., Estimated Date of Delivery: 19, 37w0d, here sent from the office for elevated Bps in the office- 140s/90s. She c/o mild headache yesterday; has some RUQ since last Friday, diagnosed with possible gallbladder problems. No vaginal bleeding or SROM. Prenatal course: uncomplicated    Review of Systems:   Ears, nose, mouth, throat, and face: negative  Respiratory: negative  Cardiovascular: negative  Gastrointestinal: positive for abdominal pain  Genitourinary:negative  Integument/breast: negative  Hematologic/lymphatic: negative  Musculoskeletal:negative  Neurological: negative  Behavioral/Psych: negative  Endocrine: negative  Allergic/Immunologic: negative    OBJECTIVE    Vitals:  /73   Pulse 112   Temp 98.2 °F (36.8 °C) (Oral)   Resp 16   Ht 5' 10\" (1.778 m)   Wt 237 lb (107.5 kg)   BMI 34.01 kg/m²     General- alert, NAD  Skin- warm and dry, no rashes seen  Psych- normal mood and affect  Neuro- CN II-XII grossly intact  Abdomen: soft, NT/ND, gravid    Fetal heart rate:         Baseline Heart Rate:  135        Accelerations:  present       Decelerations:  absent       Variability:  moderate    Contraction frequency: q3-5 minutes    ASSESSMENT:    Preeclampsia  BP normal here  Pt with headache and RUQ pain but they were present prior today and mild, dont suspect severe features  FHR Cat I    Plan:    Will check labs and urine and call Dr. Elana Nelson

## 2019-04-29 NOTE — PROGRESS NOTES
Sent from dr. Johnathan Castaneda office for [facility]. 37 weeks.  /82 on arrival

## 2019-04-30 ENCOUNTER — ANESTHESIA (OUTPATIENT)
Dept: LABOR AND DELIVERY | Age: 29
End: 2019-04-30
Payer: COMMERCIAL

## 2019-04-30 ENCOUNTER — ANESTHESIA EVENT (OUTPATIENT)
Dept: LABOR AND DELIVERY | Age: 29
End: 2019-04-30
Payer: COMMERCIAL

## 2019-04-30 VITALS — DIASTOLIC BLOOD PRESSURE: 52 MMHG | OXYGEN SATURATION: 100 % | SYSTOLIC BLOOD PRESSURE: 105 MMHG

## 2019-04-30 LAB
ABO/RH: NORMAL
ABO/RH: NORMAL
ANTIBODY SCREEN: NORMAL

## 2019-04-30 PROCEDURE — 7100000000 HC PACU RECOVERY - FIRST 15 MIN: Performed by: SPECIALIST

## 2019-04-30 PROCEDURE — 2500000003 HC RX 250 WO HCPCS: Performed by: SPECIALIST

## 2019-04-30 PROCEDURE — 2580000003 HC RX 258: Performed by: SPECIALIST

## 2019-04-30 PROCEDURE — 3609079900 HC CESAREAN SECTION: Performed by: SPECIALIST

## 2019-04-30 PROCEDURE — 94761 N-INVAS EAR/PLS OXIMETRY MLT: CPT

## 2019-04-30 PROCEDURE — 6360000002 HC RX W HCPCS: Performed by: NURSE ANESTHETIST, CERTIFIED REGISTERED

## 2019-04-30 PROCEDURE — 1220000000 HC SEMI PRIVATE OB R&B

## 2019-04-30 PROCEDURE — 6370000000 HC RX 637 (ALT 250 FOR IP)

## 2019-04-30 PROCEDURE — 6370000000 HC RX 637 (ALT 250 FOR IP): Performed by: SPECIALIST

## 2019-04-30 PROCEDURE — 36415 COLL VENOUS BLD VENIPUNCTURE: CPT

## 2019-04-30 PROCEDURE — 2500000003 HC RX 250 WO HCPCS: Performed by: NURSE ANESTHETIST, CERTIFIED REGISTERED

## 2019-04-30 PROCEDURE — 86850 RBC ANTIBODY SCREEN: CPT

## 2019-04-30 PROCEDURE — 3700000025 EPIDURAL BLOCK: Performed by: ANESTHESIOLOGY

## 2019-04-30 PROCEDURE — 6360000002 HC RX W HCPCS: Performed by: SPECIALIST

## 2019-04-30 PROCEDURE — 6360000002 HC RX W HCPCS: Performed by: ANESTHESIOLOGY

## 2019-04-30 PROCEDURE — 59514 CESAREAN DELIVERY ONLY: CPT | Performed by: PHYSICIAN ASSISTANT

## 2019-04-30 PROCEDURE — 88307 TISSUE EXAM BY PATHOLOGIST: CPT

## 2019-04-30 PROCEDURE — 86900 BLOOD TYPING SEROLOGIC ABO: CPT

## 2019-04-30 PROCEDURE — 3700000001 HC ADD 15 MINUTES (ANESTHESIA): Performed by: SPECIALIST

## 2019-04-30 PROCEDURE — 2580000003 HC RX 258: Performed by: NURSE ANESTHETIST, CERTIFIED REGISTERED

## 2019-04-30 PROCEDURE — 7100000001 HC PACU RECOVERY - ADDTL 15 MIN: Performed by: SPECIALIST

## 2019-04-30 PROCEDURE — 3700000000 HC ANESTHESIA ATTENDED CARE: Performed by: SPECIALIST

## 2019-04-30 PROCEDURE — 2709999900 HC NON-CHARGEABLE SUPPLY: Performed by: SPECIALIST

## 2019-04-30 PROCEDURE — 3E0R3BZ INTRODUCTION OF ANESTHETIC AGENT INTO SPINAL CANAL, PERCUTANEOUS APPROACH: ICD-10-PCS | Performed by: ANESTHESIOLOGY

## 2019-04-30 PROCEDURE — 86901 BLOOD TYPING SEROLOGIC RH(D): CPT

## 2019-04-30 PROCEDURE — 2500000003 HC RX 250 WO HCPCS: Performed by: ANESTHESIOLOGY

## 2019-04-30 PROCEDURE — 51701 INSERT BLADDER CATHETER: CPT

## 2019-04-30 RX ORDER — SODIUM CHLORIDE 0.9 % (FLUSH) 0.9 %
10 SYRINGE (ML) INJECTION PRN
Status: DISCONTINUED | OUTPATIENT
Start: 2019-04-30 | End: 2019-05-03 | Stop reason: HOSPADM

## 2019-04-30 RX ORDER — KETOROLAC TROMETHAMINE 30 MG/ML
30 INJECTION, SOLUTION INTRAMUSCULAR; INTRAVENOUS EVERY 6 HOURS
Status: COMPLETED | OUTPATIENT
Start: 2019-04-30 | End: 2019-05-01

## 2019-04-30 RX ORDER — LIDOCAINE HYDROCHLORIDE 10 MG/ML
INJECTION, SOLUTION INFILTRATION; PERINEURAL
Status: DISPENSED
Start: 2019-04-30 | End: 2019-05-01

## 2019-04-30 RX ORDER — NALOXONE HYDROCHLORIDE 0.4 MG/ML
0.4 INJECTION, SOLUTION INTRAMUSCULAR; INTRAVENOUS; SUBCUTANEOUS PRN
Status: DISCONTINUED | OUTPATIENT
Start: 2019-04-30 | End: 2019-05-02 | Stop reason: ALTCHOICE

## 2019-04-30 RX ORDER — MORPHINE SULFATE 1 MG/ML
INJECTION, SOLUTION EPIDURAL; INTRATHECAL; INTRAVENOUS PRN
Status: DISCONTINUED | OUTPATIENT
Start: 2019-04-30 | End: 2019-04-30 | Stop reason: SDUPTHER

## 2019-04-30 RX ORDER — CLINDAMYCIN PHOSPHATE 900 MG/50ML
900 INJECTION INTRAVENOUS
Status: COMPLETED | OUTPATIENT
Start: 2019-04-30 | End: 2019-04-30

## 2019-04-30 RX ORDER — ACETAMINOPHEN 325 MG/1
650 TABLET ORAL EVERY 4 HOURS PRN
Status: DISCONTINUED | OUTPATIENT
Start: 2019-04-30 | End: 2019-05-03 | Stop reason: HOSPADM

## 2019-04-30 RX ORDER — ONDANSETRON 2 MG/ML
4 INJECTION INTRAMUSCULAR; INTRAVENOUS EVERY 6 HOURS PRN
Status: ACTIVE | OUTPATIENT
Start: 2019-04-30 | End: 2019-05-01

## 2019-04-30 RX ORDER — ONDANSETRON 2 MG/ML
INJECTION INTRAMUSCULAR; INTRAVENOUS PRN
Status: DISCONTINUED | OUTPATIENT
Start: 2019-04-30 | End: 2019-04-30 | Stop reason: SDUPTHER

## 2019-04-30 RX ORDER — DOCUSATE SODIUM 100 MG/1
100 CAPSULE, LIQUID FILLED ORAL 2 TIMES DAILY
Status: DISCONTINUED | OUTPATIENT
Start: 2019-04-30 | End: 2019-05-03 | Stop reason: HOSPADM

## 2019-04-30 RX ORDER — FENTANYL CITRATE 50 UG/ML
INJECTION, SOLUTION INTRAMUSCULAR; INTRAVENOUS PRN
Status: DISCONTINUED | OUTPATIENT
Start: 2019-04-30 | End: 2019-04-30 | Stop reason: SDUPTHER

## 2019-04-30 RX ORDER — ACETAMINOPHEN 325 MG/1
325 TABLET ORAL EVERY 4 HOURS PRN
Status: DISCONTINUED | OUTPATIENT
Start: 2019-04-30 | End: 2019-05-02 | Stop reason: ALTCHOICE

## 2019-04-30 RX ORDER — TRISODIUM CITRATE DIHYDRATE AND CITRIC ACID MONOHYDRATE 500; 334 MG/5ML; MG/5ML
30 SOLUTION ORAL ONCE
Status: COMPLETED | OUTPATIENT
Start: 2019-04-30 | End: 2019-04-30

## 2019-04-30 RX ORDER — LIDOCAINE HYDROCHLORIDE AND EPINEPHRINE 20; 5 MG/ML; UG/ML
INJECTION, SOLUTION EPIDURAL; INFILTRATION; INTRACAUDAL; PERINEURAL PRN
Status: DISCONTINUED | OUTPATIENT
Start: 2019-04-30 | End: 2019-04-30 | Stop reason: SDUPTHER

## 2019-04-30 RX ORDER — OXYCODONE HYDROCHLORIDE 5 MG/1
5 TABLET ORAL EVERY 4 HOURS PRN
Status: DISCONTINUED | OUTPATIENT
Start: 2019-04-30 | End: 2019-05-02 | Stop reason: ALTCHOICE

## 2019-04-30 RX ORDER — FERROUS SULFATE 325(65) MG
325 TABLET ORAL 2 TIMES DAILY WITH MEALS
Status: DISCONTINUED | OUTPATIENT
Start: 2019-04-30 | End: 2019-05-03 | Stop reason: HOSPADM

## 2019-04-30 RX ORDER — LANOLIN 100 %
OINTMENT (GRAM) TOPICAL
Status: DISCONTINUED | OUTPATIENT
Start: 2019-04-30 | End: 2019-05-03 | Stop reason: HOSPADM

## 2019-04-30 RX ORDER — TRISODIUM CITRATE DIHYDRATE AND CITRIC ACID MONOHYDRATE 500; 334 MG/5ML; MG/5ML
SOLUTION ORAL
Status: COMPLETED
Start: 2019-04-30 | End: 2019-04-30

## 2019-04-30 RX ORDER — PRENATAL WITH FERROUS FUM AND FOLIC ACID 3080; 920; 120; 400; 22; 1.84; 3; 20; 10; 1; 12; 200; 27; 25; 2 [IU]/1; [IU]/1; MG/1; [IU]/1; MG/1; MG/1; MG/1; MG/1; MG/1; MG/1; UG/1; MG/1; MG/1; MG/1; MG/1
1 TABLET ORAL DAILY
Status: DISCONTINUED | OUTPATIENT
Start: 2019-04-30 | End: 2019-05-03 | Stop reason: HOSPADM

## 2019-04-30 RX ORDER — OXYCODONE HYDROCHLORIDE 5 MG/1
10 TABLET ORAL EVERY 4 HOURS PRN
Status: DISCONTINUED | OUTPATIENT
Start: 2019-04-30 | End: 2019-05-02 | Stop reason: ALTCHOICE

## 2019-04-30 RX ORDER — SODIUM CHLORIDE 0.9 % (FLUSH) 0.9 %
10 SYRINGE (ML) INJECTION EVERY 12 HOURS SCHEDULED
Status: DISCONTINUED | OUTPATIENT
Start: 2019-04-30 | End: 2019-05-03 | Stop reason: HOSPADM

## 2019-04-30 RX ORDER — NALBUPHINE HCL 10 MG/ML
5 AMPUL (ML) INJECTION EVERY 4 HOURS PRN
Status: ACTIVE | OUTPATIENT
Start: 2019-04-30 | End: 2019-05-01

## 2019-04-30 RX ORDER — ACETAMINOPHEN 650 MG
TABLET, EXTENDED RELEASE ORAL
Status: DISPENSED
Start: 2019-04-30 | End: 2019-05-01

## 2019-04-30 RX ORDER — SODIUM CHLORIDE, SODIUM LACTATE, POTASSIUM CHLORIDE, CALCIUM CHLORIDE 600; 310; 30; 20 MG/100ML; MG/100ML; MG/100ML; MG/100ML
INJECTION, SOLUTION INTRAVENOUS CONTINUOUS PRN
Status: DISCONTINUED | OUTPATIENT
Start: 2019-04-30 | End: 2019-04-30 | Stop reason: SDUPTHER

## 2019-04-30 RX ADMIN — LIDOCAINE HYDROCHLORIDE,EPINEPHRINE BITARTRATE 10 ML: 20; .005 INJECTION, SOLUTION EPIDURAL; INFILTRATION; INTRACAUDAL; PERINEURAL at 14:30

## 2019-04-30 RX ADMIN — ONDANSETRON HYDROCHLORIDE 4 MG: 2 INJECTION, SOLUTION INTRAMUSCULAR; INTRAVENOUS at 14:57

## 2019-04-30 RX ADMIN — Medication 15 ML/HR: at 08:52

## 2019-04-30 RX ADMIN — Medication 999 ML/HR: at 14:57

## 2019-04-30 RX ADMIN — SODIUM BICARBONATE 1 MEQ: 84 INJECTION, SOLUTION INTRAVENOUS at 14:30

## 2019-04-30 RX ADMIN — DOCUSATE SODIUM 100 MG: 100 CAPSULE, LIQUID FILLED ORAL at 22:36

## 2019-04-30 RX ADMIN — SODIUM CHLORIDE, POTASSIUM CHLORIDE, SODIUM LACTATE AND CALCIUM CHLORIDE: 600; 310; 30; 20 INJECTION, SOLUTION INTRAVENOUS at 14:38

## 2019-04-30 RX ADMIN — KETOROLAC TROMETHAMINE 30 MG: 30 INJECTION, SOLUTION INTRAMUSCULAR at 17:21

## 2019-04-30 RX ADMIN — MORPHINE SULFATE 4 MG: 1 INJECTION, SOLUTION EPIDURAL; INTRATHECAL; INTRAVENOUS at 14:58

## 2019-04-30 RX ADMIN — FENTANYL CITRATE 50 MCG: 50 INJECTION, SOLUTION INTRAMUSCULAR; INTRAVENOUS at 14:30

## 2019-04-30 RX ADMIN — TRISODIUM CITRATE DIHYDRATE AND CITRIC ACID MONOHYDRATE 30 ML: 500; 334 SOLUTION ORAL at 14:20

## 2019-04-30 RX ADMIN — KETOROLAC TROMETHAMINE 30 MG: 30 INJECTION, SOLUTION INTRAMUSCULAR at 23:05

## 2019-04-30 RX ADMIN — SODIUM CITRATE AND CITRIC ACID MONOHYDRATE 30 ML: 500; 334 SOLUTION ORAL at 14:20

## 2019-04-30 RX ADMIN — PHENYLEPHRINE HYDROCHLORIDE 100 MCG: 10 INJECTION INTRAVENOUS at 15:04

## 2019-04-30 RX ADMIN — LIDOCAINE HYDROCHLORIDE,EPINEPHRINE BITARTRATE 10 ML: 20; .005 INJECTION, SOLUTION EPIDURAL; INFILTRATION; INTRACAUDAL; PERINEURAL at 14:37

## 2019-04-30 RX ADMIN — SODIUM BICARBONATE 1 MEQ: 84 INJECTION, SOLUTION INTRAVENOUS at 14:37

## 2019-04-30 RX ADMIN — FENTANYL CITRATE 50 MCG: 50 INJECTION, SOLUTION INTRAMUSCULAR; INTRAVENOUS at 14:37

## 2019-04-30 RX ADMIN — CLINDAMYCIN PHOSPHATE 900 MG: 900 INJECTION, SOLUTION INTRAVENOUS at 14:28

## 2019-04-30 RX ADMIN — SODIUM CHLORIDE, POTASSIUM CHLORIDE, SODIUM LACTATE AND CALCIUM CHLORIDE: 600; 310; 30; 20 INJECTION, SOLUTION INTRAVENOUS at 00:52

## 2019-04-30 RX ADMIN — Medication 10 ML: at 23:07

## 2019-04-30 RX ADMIN — SODIUM CHLORIDE, POTASSIUM CHLORIDE, SODIUM LACTATE AND CALCIUM CHLORIDE: 600; 310; 30; 20 INJECTION, SOLUTION INTRAVENOUS at 15:02

## 2019-04-30 RX ADMIN — Medication 15 ML: at 08:49

## 2019-04-30 RX ADMIN — GENTAMICIN SULFATE 500 MG: 40 INJECTION, SOLUTION INTRAMUSCULAR; INTRAVENOUS at 14:28

## 2019-04-30 RX ADMIN — Medication 1 MILLI-UNITS/MIN: at 01:05

## 2019-04-30 ASSESSMENT — PULMONARY FUNCTION TESTS
PIF_VALUE: 0

## 2019-04-30 ASSESSMENT — PAIN SCALES - GENERAL
PAINLEVEL_OUTOF10: 3
PAINLEVEL_OUTOF10: 4
PAINLEVEL_OUTOF10: 6

## 2019-04-30 ASSESSMENT — PAIN DESCRIPTION - LOCATION: LOCATION: ABDOMEN

## 2019-04-30 ASSESSMENT — PAIN DESCRIPTION - ORIENTATION: ORIENTATION: LOWER

## 2019-04-30 ASSESSMENT — PAIN DESCRIPTION - PAIN TYPE: TYPE: ACUTE PAIN

## 2019-04-30 NOTE — PROGRESS NOTES
Dr. Alla Quevedo updated on ctx pattern, pitocin rate, sve. Dr. Alla Quevedo notified that pt does not want to get an epidural at this time because she states she \"is not uncomfortable enough and would like to wait. \" Pt also states \"I would prefer to wait to have my water broken until I have my epidural but am not ready to get it yet. \" Dr. Alla Quevedo okay with pt wishes.  Will continue to ask pt about pain level and continue to offer her the epidural.

## 2019-04-30 NOTE — BRIEF OP NOTE
Brief Postoperative Note  ______________________________________________________________    Patient: Mikie Nunez  YOB: 1990  MRN: [id number]  Date of Procedure: 2019    Pre-Op Diagnosis: Intrauterine pregnancy 37w1d, pregnancy-induced hypertension, non-progression of labor, fetal intolerance of labor, non-reassuring fetal heart rate tracing    Post-Op Diagnosis: Same + viable male infant       Procedure(s):  Primary low transverse  section    Anesthesia: Epidural, General    Surgeon(s):  Gabrielle Kayser, MD    First Assistant:  Maico Yeboah PA-C  With my technical assistance, Dr. Alla Quevedo performed a  section. I assisted- in the absence of a surgical resident- with retraction, exposure, delivery of infant, and suture cutting/management throughout the case. I was present for the entire procedure. Second Assist: Francisco Lester,  CST     Estimated Blood Loss (mL): 600 ccs    Fluids: 1. 5 L.  IV crystalloid    Drains: (+) carrasquillo    Urine output: 200 ccs clear    Complications: None    Specimens: cord gases, cord blood, placenta    Findings: viable male infant delivered at 15:21 weighing 6#2oz/2770 grams, 1 nuchal cord, 3 vessel cord, clear amniotic fluid, Apgars 8 at 1 minute and 9 at 5 minutes    Disposition: to PACU with infant in stable condition    Maico Yeboah PA-C  Date: 2019  Time: 3:36 PM

## 2019-04-30 NOTE — ANESTHESIA POSTPROCEDURE EVALUATION
Department of Anesthesiology  Postprocedure Note    Patient: Alex House  MRN: [id number]  YOB: 1990  Date of evaluation: 2019  Time:  4:46 PM     Procedure Summary     Date:  19 Room / Location:  [facility] OR    Anesthesia Start:  837 Anesthesia Stop:  0800    Procedures:        SECTION (N/A Uterus)      ANESTHESIA LABOR ANALGESIA Diagnosis:  (Fetal Intolerance of Labor)    Surgeon:  Gustavo Mariano MD Responsible Provider:  Claudene Saber, MD    Anesthesia Type:  epidural, general ASA Status:  3          Anesthesia Type: epidural, general    [place] Phase I:      [place] Phase II:      Last vitals: Reviewed and per EMR flowsheets.        Anesthesia Post Evaluation    Patient location during evaluation: PACU  Patient participation: complete - patient participated  Level of consciousness: awake and alert  Airway patency: patent  Nausea & Vomiting: no vomiting and no nausea  Complications: no  Cardiovascular status: hemodynamically stable  Respiratory status: acceptable  Hydration status: stable

## 2019-04-30 NOTE — ANESTHESIA PRE PROCEDURE
Department of Anesthesiology  Preprocedure Note       Name:  Sami Yousif   Age:  34 y.o.  :  1990                                          MRN:  [id number]         Date:  2019      Surgeon: Kee Fenton    Procedure: ANESTHESIA LABOR ANALGESIA to  DELIVERY    Medications prior to admission:   Prior to Admission medications    Medication Sig Start Date End Date Taking? Authorizing Provider   Prenatal Vit w/Ki-Rtrzyqlsp-QN (PNV PO) Take by mouth   Yes Historical Provider, MD   cefUROXime (CEFTIN) 250 MG tablet Take 1 tablet by mouth 2 times daily for 10 days 19  Dayday Monte MD   valACYclovir (VALTREX) 500 MG tablet Take 500 mg by mouth daily as needed. Historical Provider, MD       Current medications:    Current Facility-Administered Medications   Medication Dose Route Frequency Provider Last Rate Last Dose    lactated ringers infusion   Intravenous Continuous Dayday Monte  mL/hr at 19 0612      ondansetron (ZOFRAN) injection 4 mg  4 mg Intravenous Q6H PRN Dayday Monte MD        oxytocin (PITOCIN) 30 units in 500 mL infusion  1 robel-units/min Intravenous Continuous Dayday Monte MD 12 mL/hr at 19 0700 12 robel-units/min at 19 0700    butorphanol (STADOL) injection 1 mg  1 mg Intravenous Q3H PRN Dayday Monte MD           Allergies:     Allergies   Allergen Reactions    Food Swelling     APPLEs cause tongue to swell    Bactrim [Sulfamethoxazole-Trimethoprim] Hives    Pcn [Penicillins] Hives    Doxycycline Other (See Comments)     Severe gerd       Problem List:    Patient Active Problem List   Diagnosis Code    Genital herpes A60.00    PCOS (polycystic ovarian syndrome) E28.2    Hypogonadotropic hypogonadism syndrome, female ([facility] [address] E23.0    Pelvic pain in pregnancy O26.899, R10.2    Vomiting affecting pregnancy, antepartum O21.9    Pyelonephritis affecting pregnancy in third trimester O23.03    Chest pain R07.9    Back 04/29/2019    BUN 8 04/29/2019    CREATININE 0.7 04/29/2019    GFRAA >60 04/29/2019    LABGLOM >60 04/29/2019    GLUCOSE 77 04/29/2019    PROT 6.9 04/29/2019    CALCIUM 9.0 04/29/2019    BILITOT <0.2 04/29/2019    ALKPHOS 107 04/29/2019    AST 20 04/29/2019    ALT 13 04/29/2019       POC Tests: No results for input(s): POCGLU, POCNA, POCK, POCCL, POCBUN, POCHEMO, POCHCT in the last 72 hours. Coags: No results found for: PROTIME, INR, APTT    HCG (If Applicable):   Lab Results   Component Value Date    PREGTESTUR negative 05/02/2016        ABGs: No results found for: PHART, PO2ART, DGU3FRC, OLX4TMO, BEART, A3MEDKZY     Type & Screen (If Applicable):  No results found for: LABABO, LABRH     EKG 4/25/19  NSR 83 bpm    Anesthesia Evaluation  Patient summary reviewed and Nursing notes reviewed no history of anesthetic complications:   Airway: Mallampati: II  TM distance: >3 FB   Neck ROM: full  Mouth opening: > = 3 FB Dental:          Pulmonary: breath sounds clear to auscultation  (+) asthma (last asthma attack approx 10 yrs ago. curently does not treat or use rescue inhaler. ): exercise-induced asthma,                            Cardiovascular:    (+) hypertension (Pre-eclampsia in third trimester):,         Rhythm: regular  Rate: normal                    Neuro/Psych:   (+) headaches: migraine headaches,             GI/Hepatic/Renal:   (+) GERD (nausea and vomitting t/o pregnancy. last bout of emesis was one week ago. pt denies nausea at time of exam.  ): well controlled, renal disease (Pyelonephritis affecting pregnancy in third trimester):,           Endo/Other:                      ROS comment: Pelvic pain in pregnancy  Hypogonadotropic hypogonadism syndrome  PCOS (polycystic ovarian syndrome). Pt denies genital herpes.   Abdominal:           Vascular:                                      Anesthesia Plan      epidural     ASA 3     (#20 IVC in L hand. )      MIPS: Postoperative opioids intended and

## 2019-04-30 NOTE — PROGRESS NOTES
Dr. Monroe Sweet called in for update on patient. Updated on patient's CTX pattern and SVE.   Ok to restart pitocin when tracing inmproves

## 2019-04-30 NOTE — ANESTHESIA POSTPROCEDURE EVALUATION
Department of Anesthesiology  Postprocedure Note    Patient: Fredis English  MRN: [id number]  YOB: 1990  Date of evaluation: 2019  Time:  4:44 PM     Procedure Summary     Date:  19 Room / Location:  [facility] L&D OR    Anesthesia Start:  0837 Anesthesia Stop:  6280    Procedures:        SECTION (N/A Uterus)      ANESTHESIA LABOR ANALGESIA Diagnosis:  (Fetal Intolerance of Labor)    Surgeon:  Abrahan Rojo MD Responsible Provider:  Tabitha Araujo MD    Anesthesia Type:  epidural, general ASA Status:  3          Anesthesia Type: epidural, general    [place] Phase I:      [place] Phase II:      Last vitals: Reviewed and per EMR flowsheets.        Anesthesia Post Evaluation    Patient location during evaluation: PACU  Patient participation: complete - patient participated  Level of consciousness: awake and alert  Airway patency: patent  Nausea & Vomiting: no vomiting and no nausea  Complications: no  Cardiovascular status: hemodynamically stable  Respiratory status: acceptable  Hydration status: stable

## 2019-04-30 NOTE — PROGRESS NOTES
Dr Elana Nelson updated on CTX pattern and ROM.   OK to restart Pitocin in 30 mins with good variability and place internals

## 2019-04-30 NOTE — ANESTHESIA PROCEDURE NOTES
Epidural Block    Patient location during procedure: OB  Start time: 4/30/2019 8:37 AM  End time: 4/30/2019 8:55 AM  Reason for block: labor epidural  Staffing  Anesthesiologist: Richie Sher DO  Resident/CRNA: JULIANO Teixeira - NETTA  Other anesthesia staff: Ángel Ridley RN  Performed: other anesthesia staff   Preanesthetic Checklist  Completed: patient identified, site marked, surgical consent, pre-op evaluation, timeout performed, IV checked, risks and benefits discussed, monitors and equipment checked, anesthesia consent given, oxygen available and patient being monitored  Epidural  Patient position: sitting  Prep: ChloraPrep  Patient monitoring: cardiac monitor, continuous pulse ox and frequent blood pressure checks  Approach: midline  Location: lumbar (1-5)  Injection technique: JOELLEN air  Provider prep: mask and sterile gloves  Needle  Needle type: Tuohy   Needle gauge: 18 G  Needle length: 2.5 in  Needle insertion depth: 6 cm  Catheter type: end hole  Catheter size: 20 G.   Catheter at skin depth: 12 cm  Test dose: negative  Assessment  Hemodynamics: stable  Attempts: 1

## 2019-05-01 LAB
HCT VFR BLD CALC: 32.1 % (ref 34–48)
HEMOGLOBIN: 10.5 G/DL (ref 11.5–15.5)

## 2019-05-01 PROCEDURE — 2580000003 HC RX 258: Performed by: SPECIALIST

## 2019-05-01 PROCEDURE — 85018 HEMOGLOBIN: CPT

## 2019-05-01 PROCEDURE — 6370000000 HC RX 637 (ALT 250 FOR IP): Performed by: ANESTHESIOLOGY

## 2019-05-01 PROCEDURE — 6360000002 HC RX W HCPCS: Performed by: ANESTHESIOLOGY

## 2019-05-01 PROCEDURE — 36415 COLL VENOUS BLD VENIPUNCTURE: CPT

## 2019-05-01 PROCEDURE — 1220000000 HC SEMI PRIVATE OB R&B

## 2019-05-01 PROCEDURE — 6370000000 HC RX 637 (ALT 250 FOR IP): Performed by: SPECIALIST

## 2019-05-01 PROCEDURE — 6360000002 HC RX W HCPCS: Performed by: SPECIALIST

## 2019-05-01 PROCEDURE — 85014 HEMATOCRIT: CPT

## 2019-05-01 RX ORDER — OXYCODONE HYDROCHLORIDE AND ACETAMINOPHEN 5; 325 MG/1; MG/1
2 TABLET ORAL EVERY 4 HOURS PRN
Status: DISCONTINUED | OUTPATIENT
Start: 2019-05-01 | End: 2019-05-03 | Stop reason: HOSPADM

## 2019-05-01 RX ORDER — OXYCODONE HYDROCHLORIDE AND ACETAMINOPHEN 5; 325 MG/1; MG/1
1 TABLET ORAL EVERY 4 HOURS PRN
Status: DISCONTINUED | OUTPATIENT
Start: 2019-05-01 | End: 2019-05-03 | Stop reason: HOSPADM

## 2019-05-01 RX ORDER — IBUPROFEN 800 MG/1
800 TABLET ORAL EVERY 8 HOURS PRN
Status: DISCONTINUED | OUTPATIENT
Start: 2019-05-01 | End: 2019-05-03 | Stop reason: HOSPADM

## 2019-05-01 RX ADMIN — Medication 10 ML: at 05:07

## 2019-05-01 RX ADMIN — KETOROLAC TROMETHAMINE 30 MG: 30 INJECTION, SOLUTION INTRAMUSCULAR at 14:09

## 2019-05-01 RX ADMIN — KETOROLAC TROMETHAMINE 30 MG: 30 INJECTION, SOLUTION INTRAMUSCULAR at 05:06

## 2019-05-01 RX ADMIN — Medication 10 ML: at 08:25

## 2019-05-01 RX ADMIN — Medication 1 TABLET: at 08:25

## 2019-05-01 RX ADMIN — DOCUSATE SODIUM 100 MG: 100 CAPSULE, LIQUID FILLED ORAL at 20:10

## 2019-05-01 RX ADMIN — OXYCODONE HYDROCHLORIDE 5 MG: 5 TABLET ORAL at 17:57

## 2019-05-01 RX ADMIN — ENOXAPARIN SODIUM 60 MG: 100 INJECTION SUBCUTANEOUS at 04:22

## 2019-05-01 RX ADMIN — DOCUSATE SODIUM 100 MG: 100 CAPSULE, LIQUID FILLED ORAL at 08:25

## 2019-05-01 ASSESSMENT — PAIN SCALES - GENERAL
PAINLEVEL_OUTOF10: 5
PAINLEVEL_OUTOF10: 3
PAINLEVEL_OUTOF10: 0
PAINLEVEL_OUTOF10: 6
PAINLEVEL_OUTOF10: 1

## 2019-05-01 ASSESSMENT — PAIN DESCRIPTION - PROGRESSION
CLINICAL_PROGRESSION: GRADUALLY WORSENING
CLINICAL_PROGRESSION: GRADUALLY WORSENING

## 2019-05-01 ASSESSMENT — PAIN DESCRIPTION - PAIN TYPE
TYPE: SURGICAL PAIN
TYPE: SURGICAL PAIN

## 2019-05-01 ASSESSMENT — PAIN DESCRIPTION - ORIENTATION
ORIENTATION: LOWER
ORIENTATION: LOWER

## 2019-05-01 ASSESSMENT — PAIN DESCRIPTION - FREQUENCY
FREQUENCY: INTERMITTENT
FREQUENCY: INTERMITTENT

## 2019-05-01 ASSESSMENT — PAIN DESCRIPTION - LOCATION
LOCATION: ABDOMEN;INCISION
LOCATION: ABDOMEN;INCISION

## 2019-05-01 ASSESSMENT — PAIN - FUNCTIONAL ASSESSMENT
PAIN_FUNCTIONAL_ASSESSMENT: ACTIVITIES ARE NOT PREVENTED
PAIN_FUNCTIONAL_ASSESSMENT: ACTIVITIES ARE NOT PREVENTED

## 2019-05-01 ASSESSMENT — PAIN DESCRIPTION - DESCRIPTORS
DESCRIPTORS: ACHING;BURNING;CRAMPING
DESCRIPTORS: ACHING;BURNING;CRAMPING

## 2019-05-01 NOTE — LACTATION NOTE
Mom reports baby is nursing well at this time. Encouraged skin to skin and frequent attempts at breast to stimulate milk production. Instructed on normal infant behavior in the first 12-24 hours. Encouraged to feed infant as often and as long as the infant wishes to do so. Instructed on benefits of skin to skin, rooming-in and avoidance of pacifier use until breastfeeding is well established. Educated on making sure infant has an open airway while breastfeeding and skin to skin. Instructed on feeding cues and waking techniques to try. Information given regarding health benefits of colostrum and exclusive breastfeeding. Encouraged to call with any concerns. Mom requests an electric breast pump for home to increase milk supply.

## 2019-05-02 PROCEDURE — 6370000000 HC RX 637 (ALT 250 FOR IP): Performed by: OBSTETRICS & GYNECOLOGY

## 2019-05-02 PROCEDURE — 6360000002 HC RX W HCPCS: Performed by: SPECIALIST

## 2019-05-02 PROCEDURE — 6370000000 HC RX 637 (ALT 250 FOR IP): Performed by: SPECIALIST

## 2019-05-02 PROCEDURE — 1220000000 HC SEMI PRIVATE OB R&B

## 2019-05-02 RX ADMIN — OXYCODONE HYDROCHLORIDE AND ACETAMINOPHEN 1 TABLET: 5; 325 TABLET ORAL at 16:08

## 2019-05-02 RX ADMIN — DOCUSATE SODIUM 100 MG: 100 CAPSULE, LIQUID FILLED ORAL at 21:21

## 2019-05-02 RX ADMIN — OXYCODONE HYDROCHLORIDE AND ACETAMINOPHEN 1 TABLET: 5; 325 TABLET ORAL at 00:10

## 2019-05-02 RX ADMIN — IBUPROFEN 800 MG: 800 TABLET ORAL at 21:21

## 2019-05-02 RX ADMIN — OXYCODONE HYDROCHLORIDE AND ACETAMINOPHEN 1 TABLET: 5; 325 TABLET ORAL at 09:51

## 2019-05-02 RX ADMIN — IBUPROFEN 800 MG: 800 TABLET ORAL at 04:04

## 2019-05-02 RX ADMIN — IBUPROFEN 800 MG: 800 TABLET ORAL at 12:43

## 2019-05-02 RX ADMIN — ENOXAPARIN SODIUM 60 MG: 100 INJECTION SUBCUTANEOUS at 06:57

## 2019-05-02 RX ADMIN — DOCUSATE SODIUM 100 MG: 100 CAPSULE, LIQUID FILLED ORAL at 09:51

## 2019-05-02 RX ADMIN — MAGNESIUM HYDROXIDE 30 ML: 400 SUSPENSION ORAL at 21:20

## 2019-05-02 RX ADMIN — Medication 1 TABLET: at 09:51

## 2019-05-02 ASSESSMENT — PAIN SCALES - GENERAL
PAINLEVEL_OUTOF10: 0
PAINLEVEL_OUTOF10: 0
PAINLEVEL_OUTOF10: 5
PAINLEVEL_OUTOF10: 6
PAINLEVEL_OUTOF10: 6
PAINLEVEL_OUTOF10: 5
PAINLEVEL_OUTOF10: 6
PAINLEVEL_OUTOF10: 3
PAINLEVEL_OUTOF10: 0
PAINLEVEL_OUTOF10: 5

## 2019-05-02 ASSESSMENT — PAIN DESCRIPTION - PAIN TYPE
TYPE: SURGICAL PAIN
TYPE: SURGICAL PAIN

## 2019-05-02 ASSESSMENT — PAIN DESCRIPTION - ONSET: ONSET: GRADUAL

## 2019-05-02 ASSESSMENT — PAIN DESCRIPTION - PROGRESSION: CLINICAL_PROGRESSION: GRADUALLY WORSENING

## 2019-05-02 ASSESSMENT — PAIN DESCRIPTION - LOCATION
LOCATION: ABDOMEN;INCISION
LOCATION: ABDOMEN

## 2019-05-02 ASSESSMENT — PAIN DESCRIPTION - FREQUENCY: FREQUENCY: INTERMITTENT

## 2019-05-02 ASSESSMENT — PAIN DESCRIPTION - ORIENTATION: ORIENTATION: LOWER

## 2019-05-02 ASSESSMENT — PAIN - FUNCTIONAL ASSESSMENT: PAIN_FUNCTIONAL_ASSESSMENT: ACTIVITIES ARE NOT PREVENTED

## 2019-05-02 NOTE — OP NOTE
[address] [address]                                OPERATIVE REPORT    PATIENT NAME: Zach Rodriguez                      :        1990  MED REC NO:   [id number]                            ROOM:       7257  ACCOUNT NO:   [de-identified]                           ADMIT DATE: 2019  PROVIDER:     Geovanni Reddy MD    DATE OF PROCEDURE:  2019    PREOPERATIVE DIAGNOSES:  IUP at 37 weeks and 1 day, mild preeclampsia,  nonreassuring fetal heart tracing. POSTOPERATIVE DIAGNOSES:  IUP at 37 weeks and 1 day, mild preeclampsia,  nonreassuring fetal heart tracing, nuchal cord x1. ANESTHESIA:  Epidural.    SURGEON:  Geovanni Reddy MD.    ESTIMATED BLOOD LOSS:  600 mL. DRAINS:  Minor. COMPLICATIONS:  None. FINDINGS:  A 2778 gm baby boy, Apgar's 8 and 9. Normal ovaries, uterus,  and tubes. Nuchal cord x1. The patient tolerated the procedure well. DESCRIPTION OF PROCEDURE:  After the risks, benefits, and alternatives  were reviewed with the patient and she accepted, she was taken to the  Labor and OR Delivery Suite, and placed in dorsal supine position. She  was prepped and draped in the usual sterile fashion. Next, an Allis  clamp was used to test for adequacy of spinal and found to be adequate. A Pfannenstiel skin incision was made with a scalpel and carried down  through the subcutaneous tissue using a Bovie. Next, the Bovie was used  to nick the fascia in the midline and the incision was carried  bilaterally using Thomason scissors. Next, superior portion of the fascia  was tented up and the muscle was taken down off the fascia using the  Bovie. The same was done inferiorly. Next, the muscles were   in the midline. The peritoneum was identified and tented up with two  straight clamps, incised and carried superiorly and inferiorly using  Metzenbaum scissors.   Next, a DeLee retractor was placed. Next, the  lower uterine segment was scored in a transverse fashion, entered in the  midline, and the incision was carried bilaterally and upwards using  bandage scissors. Next, the head was delivered atraumatically and the  nasal and oropharynx were suctioned. Next, the nuchal cord was noted  and reduced. Next, the rest of the baby was delivered without  difficulty. Next, the cord was clamped and cut. Next, the placenta was  delivered. Next, the uterus was exteriorized. Next, the uterine cavity  was cleaned with a clean wet lap. Next, the uterine incision was  reapproximated with a running locking 0 Vicryl suture. Next, posterior  to the uterus was cleaned of blood and clot, the uterus was repositioned  into the peritoneal cavity, and gutters were cleaned of blood and clot. Next, the uterine incision was reinspected. No bleeding was noted. Next, the fascia was reapproximated with a running simple 1 Vicryl  suture. Subcutaneous tissue was irrigated and bleeders were cauterized  with Bovie. The skin was reapproximated with a subcuticular absorbable  stapler. Instrument, needle, and lap counts were correct x2. The  patient tolerated the procedure well. She will go to Recovery.         Jean Marie Russo MD    D: 05/02/2019 8:59:55       T: 05/02/2019 9:07:58     [email or web address]  Job#: [id number]     Doc#: [id number]    CC:

## 2019-05-03 VITALS
OXYGEN SATURATION: 97 % | DIASTOLIC BLOOD PRESSURE: 75 MMHG | WEIGHT: 237 LBS | BODY MASS INDEX: 33.93 KG/M2 | SYSTOLIC BLOOD PRESSURE: 135 MMHG | RESPIRATION RATE: 18 BRPM | TEMPERATURE: 98.2 F | HEIGHT: 70 IN | HEART RATE: 72 BPM

## 2019-05-03 PROCEDURE — 6370000000 HC RX 637 (ALT 250 FOR IP): Performed by: SPECIALIST

## 2019-05-03 PROCEDURE — 90471 IMMUNIZATION ADMIN: CPT | Performed by: SPECIALIST

## 2019-05-03 PROCEDURE — 6360000002 HC RX W HCPCS: Performed by: SPECIALIST

## 2019-05-03 PROCEDURE — 90715 TDAP VACCINE 7 YRS/> IM: CPT | Performed by: SPECIALIST

## 2019-05-03 RX ORDER — IBUPROFEN 800 MG/1
800 TABLET ORAL EVERY 8 HOURS PRN
Qty: 60 TABLET | Refills: 0 | Status: SHIPPED | OUTPATIENT
Start: 2019-05-03 | End: 2020-02-04

## 2019-05-03 RX ADMIN — ENOXAPARIN SODIUM 60 MG: 100 INJECTION SUBCUTANEOUS at 08:27

## 2019-05-03 RX ADMIN — IBUPROFEN 800 MG: 800 TABLET ORAL at 06:53

## 2019-05-03 RX ADMIN — DOCUSATE SODIUM 100 MG: 100 CAPSULE, LIQUID FILLED ORAL at 08:26

## 2019-05-03 RX ADMIN — Medication 1 TABLET: at 08:26

## 2019-05-03 RX ADMIN — TETANUS TOXOID, REDUCED DIPHTHERIA TOXOID AND ACELLULAR PERTUSSIS VACCINE, ADSORBED 0.5 ML: 5; 2.5; 8; 8; 2.5 SUSPENSION INTRAMUSCULAR at 08:27

## 2019-05-03 ASSESSMENT — PAIN DESCRIPTION - RADICULAR PAIN: RADICULAR_PAIN: ABSENT

## 2019-05-03 ASSESSMENT — PAIN SCALES - GENERAL: PAINLEVEL_OUTOF10: 5

## 2019-05-03 NOTE — LACTATION NOTE
Assisted with latch using football hold. Observed good latch with audible swallows. Discussed signs of effective milk transfer to include adequate wet/dirty diapers. Instructed on normal infant behavior in the first 12-24 hrs, benefits of skin to skin. Reviewed waking techniques and the importance of frequent feedings- 8-12 times/ 24 hrs. Taught how to recognize feeding cues. Encouraged to feed infant as often and for as long as the infant wishes to do so.

## 2019-05-03 NOTE — FLOWSHEET NOTE
Instructed patient on discharge care. Verbal understanding given and questions answered. Denies further needs.

## 2019-05-03 NOTE — FLOWSHEET NOTE
Discharged home. Taken out in wheelchair with belongings, discharge information and prescription. Infant in stable condition with mother.

## 2019-05-03 NOTE — PROGRESS NOTES
CLINICAL PHARMACY NOTE: MEDS TO [address] [facility] Patient?: No  Total # of Prescriptions Filled: 1   The following medications were delivered to the patient:  · Motrin 800  Total # of Interventions Completed: 3  Time Spent (min): 30    Additional Documentation:  Verified name, , and wristband

## 2019-05-03 NOTE — PLAN OF CARE
Problem: Pain - Acute:  Goal: Pain level will decrease  Description  Pain level will decrease  Outcome: Met This Shift  Goal: Able to cope with pain  Description  Able to cope with pain  Outcome: Met This Shift     Problem: Pain:  Description  Pain management should include both nonpharmacologic and pharmacologic interventions.   Goal: Pain level will decrease  Description  Pain level will decrease  Outcome: Met This Shift     Problem: Mood - Altered:  Goal: Mood stable  Description  Mood stable  Outcome: Met This Shift     Problem: Nausea/Vomiting:  Goal: Absence of nausea/vomiting  Description  Absence of nausea/vomiting  Outcome: Met This Shift     Problem: Venous Thromboembolism:  Goal: Will show no signs or symptoms of venous thromboembolism  Description  Will show no signs or symptoms of venous thromboembolism  Outcome: Met This Shift  Goal: Absence of signs or symptoms of impaired coagulation  Description  Absence of signs or symptoms of impaired coagulation  Outcome: Met This Shift
age  Description  Weight loss within specified parameters for age  Outcome: Ongoing

## 2019-05-12 NOTE — DISCHARGE SUMMARY
Kelli Mares    Admission Date:  2019  Discharge date:  2019       34 y.o.  with a diagnosis of IUP at 39 2/7 diagnosed with pyelonephritis. She was treated with IV antibiotics. She also developed right sided chest pain and was evaluated nby medicine and pulmonology and diagnosed with costochondritis. She was discharged on day 2 with a script for ceftin. She was discharged in good condition to home.

## 2019-05-12 NOTE — DISCHARGE SUMMARY
Osei Mares    Admission Date:  2019  Discharge date:  5/3/2019       34 y.o.  with a diagnosis of IUP at 40 1/7 weeks underwent IOL secondary to mild pre-eclampsia on 19. She subsequently underwent C/S secondary to non-rassurering FHT's. She had a 3 day uncomplicated stay and was discharged to home in good condition on ibupeofen. Please refer to the chart for further details.

## 2021-04-02 ENCOUNTER — APPOINTMENT (OUTPATIENT)
Dept: ULTRASOUND IMAGING | Age: 31
End: 2021-04-02
Payer: COMMERCIAL

## 2021-04-02 ENCOUNTER — HOSPITAL ENCOUNTER (EMERGENCY)
Age: 31
Discharge: HOME | End: 2021-04-02
Attending: EMERGENCY MEDICINE
Payer: COMMERCIAL

## 2021-04-02 ENCOUNTER — HOSPITAL ENCOUNTER (OUTPATIENT)
Age: 31
Discharge: HOME | End: 2021-04-02
Payer: COMMERCIAL

## 2021-04-02 VITALS
HEART RATE: 96 BPM | WEIGHT: 185 LBS | RESPIRATION RATE: 16 BRPM | OXYGEN SATURATION: 100 % | TEMPERATURE: 98.1 F | BODY MASS INDEX: 26.48 KG/M2 | SYSTOLIC BLOOD PRESSURE: 107 MMHG | DIASTOLIC BLOOD PRESSURE: 71 MMHG | HEIGHT: 70 IN

## 2021-04-02 DIAGNOSIS — O03.4 INCOMPLETE MISCARRIAGE: Primary | ICD-10-CM

## 2021-04-02 LAB
ALBUMIN SERPL-MCNC: 4.6 G/DL (ref 3.5–5.2)
ALP BLD-CCNC: 66 U/L (ref 35–104)
ALT SERPL-CCNC: 9 U/L (ref 0–32)
AMORPHOUS: ABNORMAL
ANION GAP SERPL CALCULATED.3IONS-SCNC: 5 MMOL/L (ref 7–16)
APTT: 34 SEC (ref 24.5–35.1)
AST SERPL-CCNC: 13 U/L (ref 0–31)
BACTERIA: ABNORMAL /HPF
BASOPHILS ABSOLUTE: 0.03 E9/L (ref 0–0.2)
BASOPHILS RELATIVE PERCENT: 0.3 % (ref 0–2)
BILIRUB SERPL-MCNC: 0.3 MG/DL (ref 0–1.2)
BILIRUBIN DIRECT: <0.2 MG/DL (ref 0–0.3)
BILIRUBIN URINE: NEGATIVE
BILIRUBIN, INDIRECT: NORMAL MG/DL (ref 0–1)
BLOOD, URINE: ABNORMAL
BUN BLDV-MCNC: 15 MG/DL (ref 6–20)
CALCIUM SERPL-MCNC: 9.4 MG/DL (ref 8.6–10.2)
CHLORIDE BLD-SCNC: 105 MMOL/L (ref 98–107)
CLARITY: CLEAR
CO2: 30 MMOL/L (ref 22–29)
COLOR: YELLOW
CREAT SERPL-MCNC: 1.2 MG/DL (ref 0.5–1)
EOSINOPHILS ABSOLUTE: 0.21 E9/L (ref 0.05–0.5)
EOSINOPHILS RELATIVE PERCENT: 2.2 % (ref 0–6)
EPITHELIAL CELLS, UA: ABNORMAL /HPF
GFR AFRICAN AMERICAN: >60
GFR NON-AFRICAN AMERICAN: 53 ML/MIN/1.73
GLUCOSE BLD-MCNC: 106 MG/DL (ref 74–99)
GLUCOSE URINE: NEGATIVE MG/DL
GONADOTROPIN, CHORIONIC (HCG) QUANT: 164.2 MIU/ML
GONADOTROPIN, CHORIONIC (HCG) QUANT: 180.4 MIU/ML
HCG, URINE, POC: POSITIVE
HCT VFR BLD CALC: 38.3 % (ref 34–48)
HEMOGLOBIN: 12.1 G/DL (ref 11.5–15.5)
IMMATURE GRANULOCYTES #: 0.02 E9/L
IMMATURE GRANULOCYTES %: 0.2 % (ref 0–5)
KETONES, URINE: NEGATIVE MG/DL
LEUKOCYTE ESTERASE, URINE: NEGATIVE
LYMPHOCYTES ABSOLUTE: 2 E9/L (ref 1.5–4)
LYMPHOCYTES RELATIVE PERCENT: 21.1 % (ref 20–42)
Lab: NORMAL
MCH RBC QN AUTO: 29.6 PG (ref 26–35)
MCHC RBC AUTO-ENTMCNC: 31.6 % (ref 32–34.5)
MCV RBC AUTO: 93.6 FL (ref 80–99.9)
MONOCYTES ABSOLUTE: 0.54 E9/L (ref 0.1–0.95)
MONOCYTES RELATIVE PERCENT: 5.7 % (ref 2–12)
NEGATIVE QC PASS/FAIL: NORMAL
NEUTROPHILS ABSOLUTE: 6.67 E9/L (ref 1.8–7.3)
NEUTROPHILS RELATIVE PERCENT: 70.5 % (ref 43–80)
NITRITE, URINE: NEGATIVE
PDW BLD-RTO: 11.9 FL (ref 11.5–15)
PH UA: 7 (ref 5–9)
PLATELET # BLD: 255 E9/L (ref 130–450)
PMV BLD AUTO: 9 FL (ref 7–12)
POSITIVE QC PASS/FAIL: NORMAL
POTASSIUM SERPL-SCNC: 4.1 MMOL/L (ref 3.5–5)
PROTEIN UA: NEGATIVE MG/DL
RBC # BLD: 4.09 E12/L (ref 3.5–5.5)
RBC UA: ABNORMAL /HPF (ref 0–2)
SODIUM BLD-SCNC: 140 MMOL/L (ref 132–146)
SPECIFIC GRAVITY UA: 1.02 (ref 1–1.03)
TOTAL PROTEIN: 7.5 G/DL (ref 6.4–8.3)
UROBILINOGEN, URINE: 0.2 E.U./DL
WBC # BLD: 9.5 E9/L (ref 4.5–11.5)
WBC UA: ABNORMAL /HPF (ref 0–5)

## 2021-04-02 PROCEDURE — 84702 CHORIONIC GONADOTROPIN TEST: CPT

## 2021-04-02 PROCEDURE — 93975 VASCULAR STUDY: CPT

## 2021-04-02 PROCEDURE — 36415 COLL VENOUS BLD VENIPUNCTURE: CPT

## 2021-04-02 PROCEDURE — 2580000003 HC RX 258: Performed by: NURSE PRACTITIONER

## 2021-04-02 PROCEDURE — 76801 OB US < 14 WKS SINGLE FETUS: CPT

## 2021-04-02 PROCEDURE — 80076 HEPATIC FUNCTION PANEL: CPT

## 2021-04-02 PROCEDURE — 85025 COMPLETE CBC W/AUTO DIFF WBC: CPT

## 2021-04-02 PROCEDURE — 99283 EMERGENCY DEPT VISIT LOW MDM: CPT

## 2021-04-02 PROCEDURE — 85730 THROMBOPLASTIN TIME PARTIAL: CPT

## 2021-04-02 PROCEDURE — 81001 URINALYSIS AUTO W/SCOPE: CPT

## 2021-04-02 PROCEDURE — 80048 BASIC METABOLIC PNL TOTAL CA: CPT

## 2021-04-02 RX ORDER — 0.9 % SODIUM CHLORIDE 0.9 %
1000 INTRAVENOUS SOLUTION INTRAVENOUS ONCE
Status: COMPLETED | OUTPATIENT
Start: 2021-04-02 | End: 2021-04-02

## 2021-04-02 RX ADMIN — SODIUM CHLORIDE 1000 ML: 9 INJECTION, SOLUTION INTRAVENOUS at 14:50

## 2021-04-02 ASSESSMENT — PAIN DESCRIPTION - LOCATION: LOCATION: ABDOMEN

## 2021-04-02 ASSESSMENT — PAIN SCALES - GENERAL: PAINLEVEL_OUTOF10: 2

## 2021-04-02 ASSESSMENT — PAIN DESCRIPTION - DESCRIPTORS: DESCRIPTORS: CRAMPING

## 2021-04-02 ASSESSMENT — PAIN DESCRIPTION - PAIN TYPE: TYPE: ACUTE PAIN

## 2021-04-02 ASSESSMENT — PAIN DESCRIPTION - PROGRESSION: CLINICAL_PROGRESSION: NOT CHANGED

## 2021-04-02 NOTE — ED PROVIDER NOTES
Negative    Ketones, Urine Negative Negative mg/dL    Specific Gravity, UA 1.020 1.005 - 1.030    Blood, Urine LARGE (A) Negative    pH, UA 7.0 5.0 - 9.0    Protein, UA Negative Negative mg/dL    Urobilinogen, Urine 0.2 <2.0 E.U./dL    Nitrite, Urine Negative Negative    Leukocyte Esterase, Urine Negative Negative   CBC Auto Differential   Result Value Ref Range    WBC 9.5 4.5 - 11.5 E9/L    RBC 4.09 3.50 - 5.50 E12/L    Hemoglobin 12.1 11.5 - 15.5 g/dL    Hematocrit 38.3 34.0 - 48.0 %    MCV 93.6 80.0 - 99.9 fL    MCH 29.6 26.0 - 35.0 pg    MCHC 31.6 (L) 32.0 - 34.5 %    RDW 11.9 11.5 - 15.0 fL    Platelets 407 304 - 886 E9/L    MPV 9.0 7.0 - 12.0 fL    Neutrophils % 70.5 43.0 - 80.0 %    Immature Granulocytes % 0.2 0.0 - 5.0 %    Lymphocytes % 21.1 20.0 - 42.0 %    Monocytes % 5.7 2.0 - 12.0 %    Eosinophils % 2.2 0.0 - 6.0 %    Basophils % 0.3 0.0 - 2.0 %    Neutrophils Absolute 6.67 1.80 - 7.30 E9/L    Immature Granulocytes # 0.02 E9/L    Lymphocytes Absolute 2.00 1.50 - 4.00 E9/L    Monocytes Absolute 0.54 0.10 - 0.95 E9/L    Eosinophils Absolute 0.21 0.05 - 0.50 E9/L    Basophils Absolute 0.03 0.00 - 0.20 E9/L   hCG, quantitative, pregnancy   Result Value Ref Range    hCG Quant 164.2 (H) <10 mIU/mL   Basic Metabolic Panel   Result Value Ref Range    Sodium 140 132 - 146 mmol/L    Potassium 4.1 3.5 - 5.0 mmol/L    Chloride 105 98 - 107 mmol/L    CO2 30 (H) 22 - 29 mmol/L    Anion Gap 5 (L) 7 - 16 mmol/L    Glucose 106 (H) 74 - 99 mg/dL    BUN 15 6 - 20 mg/dL    CREATININE 1.2 (H) 0.5 - 1.0 mg/dL    GFR Non-African American 53 >=60 mL/min/1.73    GFR African American >60     Calcium 9.4 8.6 - 10.2 mg/dL   APTT   Result Value Ref Range    aPTT 34.0 24.5 - 35.1 sec   Hepatic function panel   Result Value Ref Range    Total Protein 7.5 6.4 - 8.3 g/dL    Albumin 4.6 3.5 - 5.2 g/dL    Alkaline Phosphatase 66 35 - 104 U/L    ALT 9 0 - 32 U/L    AST 13 0 - 31 U/L    Total Bilirubin 0.3 0.0 - 1.2 mg/dL    Bilirubin, Direct <0.2 0.0 - 0.3 mg/dL    Bilirubin, Indirect see below 0.0 - 1.0 mg/dL   Microscopic Urinalysis   Result Value Ref Range    WBC, UA 5-10 (A) 0 - 5 /HPF    RBC, UA 5-10 (A) 0 - 2 /HPF    Epithelial Cells, UA MODERATE /HPF    Bacteria, UA MODERATE (A) None Seen /HPF    Amorphous, UA FEW    POC Pregnancy Urine Qual   Result Value Ref Range    HCG, Urine, POC Positive Negative    Lot Number [id number]     Positive QC Pass/Fail Pass     Negative QC Pass/Fail Pass      Imaging: All Radiology results interpreted by Radiologist unless otherwise noted. US DUP ABD PEL RETRO SCROT COMPLETE   Final Result   1. There is a fluid collection in the vicinity of the fundal endometrium   which may represent an early gestational sac. Neither a yolk sac or fetal   pole identified at this time. Based on mean sac diameter the estimated   gestational age by ultrasound is 5 weeks and 0 days which corresponds to an   estimated date of delivery of December 3, 2021.      2.  Normal appearance of the bilateral ovaries. There are no adnexal masses. Normal ovarian vascularity. Recommendation: Recommend serial quantitative beta HCG evaluations with   repeat pelvic ultrasound in 4-7 days to reassess. US OB LESS THAN 14 WEEKS SINGLE OR FIRST GESTATION   Final Result   1. There is a fluid collection in the vicinity of the fundal endometrium   which may represent an early gestational sac. Neither a yolk sac or fetal   pole identified at this time. Based on mean sac diameter the estimated   gestational age by ultrasound is 5 weeks and 0 days which corresponds to an   estimated date of delivery of December 3, 2021.      2.  Normal appearance of the bilateral ovaries. There are no adnexal masses. Normal ovarian vascularity. Recommendation: Recommend serial quantitative beta HCG evaluations with   repeat pelvic ultrasound in 4-7 days to reassess.            ED Course / Medical Decision Making     Medications   0.9 % sodium chloride bolus (0 mLs Intravenous Stopped 21)        Re-examination:  21       Time:  Patients condition remains stable. Patient and spouse advised on findings of ultrasound and she was tearful. Patient is relatively comfortable and instructed on signs and symptoms warranting immediate return to the ED. Consults:   IP CONSULT TO OB GYN [id number] Discussed patient with Dr. Mike Blair and feels she is having a miscarriage and she can be discharged with follow-up with Dr. Bruce Morataya next week. Procedures:   none    MDM:   This is a  28-year-old female who presents today for evaluation to rule out ectopic pregnancy by her gynecologist.  Patient has been having ongoing vaginal bleeding for the past month and was found to be positive pregnancy test with hCG of 68 last week and states it is 180s this week. She has only been saturating 1-2 pads intermittently daily. Patient has a benign abdominal examination on evaluation has minimal bleeding and states she recently had a vaginal exam last Thursday in the office. She denies any chest pain, weakness or any dizziness, palpitations or shortness of breath. Patient will be given IV fluids obtain lab work and ultrasounds to rule out ectopic pregnancy. Urinalysis reveals large amount of blood; 5-10 WBCs and moderate bacteria with moderate epithelial cells most likely contaminated from vaginal bleeding and is asymptomatic. Ultrasound reveals a fluid collection in the vicinity of the fundal endometrium which may represent early gestation and did not note a yolk sac or fetal pole at this time and gestational age by ultrasound is 5 weeks 0 days and has normal appearance of bilateral ovaries with no signs of ectopic pregnancy. Recommends serial quantitative beta hCGs for evaluation. Patient is O+ blood type does not need RhoGam.  WBCs 9.5; Hgb 12.1.   Patient was advised on signs and symptoms warranting immediate return to the ED for reevaluation such as excessive bleeding more than 1 pad per hour for 3 consecutive hours, fever, chills or any other symptoms. 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 She was instructed to follow-up with her gynecologist next week. Advised on pelvic rest.  Patient is afebrile, nontoxic in appearance, hemodynamically stable for discharge. Counseling:  I reviewed today's visit with the patient and spouse / life partner in addition to providing specific details for the plan of care and counseling regarding the diagnosis and prognosis. Questions are answered at this time and are agreeable with the plan. Assessment      1. Incomplete miscarriage      Plan   Discharge home. Patient condition is stable    New Medications     New Prescriptions    No medications on file     Electronically signed by JULIANO Fletcher CNP   DD: 4/2/21  **This report was transcribed using voice recognition software. Every effort was made to ensure accuracy; however, inadvertent computerized transcription errors may be present.   END OF ED PROVIDER NOTE      JULIANO Fletcher CNP  04/02/21 [id number]

## 2021-04-05 ENCOUNTER — HOSPITAL ENCOUNTER (OUTPATIENT)
Age: 31
Discharge: HOME | End: 2021-04-05
Payer: COMMERCIAL

## 2021-04-05 LAB — GONADOTROPIN, CHORIONIC (HCG) QUANT: 143.3 MIU/ML

## 2021-04-05 PROCEDURE — 84702 CHORIONIC GONADOTROPIN TEST: CPT

## 2021-04-05 PROCEDURE — 36415 COLL VENOUS BLD VENIPUNCTURE: CPT

## 2022-08-22 ENCOUNTER — HOSPITAL ENCOUNTER (OUTPATIENT)
Age: 32
Setting detail: OBSERVATION
Discharge: HOME | End: 2022-08-22
Attending: OBSTETRICS & GYNECOLOGY | Admitting: OBSTETRICS & GYNECOLOGY
Payer: COMMERCIAL

## 2022-08-22 VITALS
HEART RATE: 97 BPM | DIASTOLIC BLOOD PRESSURE: 71 MMHG | SYSTOLIC BLOOD PRESSURE: 130 MMHG | TEMPERATURE: 98.6 F | RESPIRATION RATE: 16 BRPM

## 2022-08-22 PROBLEM — R10.30 LOWER ABDOMINAL PAIN: Status: ACTIVE | Noted: 2022-08-22

## 2022-08-22 LAB
BACTERIA: ABNORMAL /HPF
BILIRUBIN URINE: NEGATIVE
BLOOD, URINE: NEGATIVE
CLARITY: CLEAR
COLOR: YELLOW
EPITHELIAL CELLS, UA: ABNORMAL /HPF
GLUCOSE URINE: NEGATIVE MG/DL
KETONES, URINE: NEGATIVE MG/DL
LEUKOCYTE ESTERASE, URINE: NEGATIVE
NITRITE, URINE: NEGATIVE
PH UA: 6.5 (ref 5–9)
PROTEIN UA: NEGATIVE MG/DL
RBC UA: ABNORMAL /HPF (ref 0–2)
RENAL EPITHELIAL, UA: ABNORMAL /HPF
SPECIFIC GRAVITY UA: <=1.005 (ref 1–1.03)
UROBILINOGEN, URINE: 0.2 E.U./DL
WBC UA: ABNORMAL /HPF (ref 0–5)

## 2022-08-22 PROCEDURE — 99211 OFF/OP EST MAY X REQ PHY/QHP: CPT

## 2022-08-22 PROCEDURE — 81001 URINALYSIS AUTO W/SCOPE: CPT

## 2022-08-22 PROCEDURE — 0502F SUBSEQUENT PRENATAL CARE: CPT | Performed by: MIDWIFE

## 2022-08-23 NOTE — PROGRESS NOTES
Patient presents to unit for lower abdominal pain that radiates both to and from her back. Denies any contractions, VB, or LOF. Perceives positive fetal movement. FHT spot checked, 150-152 bpm. TOCO placed. VSS. Call light within reach.

## 2022-08-23 NOTE — DISCHARGE INSTRUCTIONS
Home Undelivered Discharge Instructions    After Discharge Orders:    Future Appointments   Date Time Provider Sandie Cherryi   9/14/2022  3:15 PM JULIANO Dickerson CNM [place] [facility]   10/12/2022 10:10 AM MD MARIO ALBERTO Morin [place]   10/12/2022 10:20 AM MD MARIO ALBERTO Morin [place] [facility]   10/26/2022  9:45 AM JULIANO Dickerson CNM [place]     If pain increases or continues to persist, give Dr. Glenis Edouard a call.                Diet:  normal diet as tolerated    Rest: normal activity as tolerated, light activity for a few days      Call physician or midwife, return to Labor and Delivery, call 911, or go to the nearest Emergency Room if: increased leakage or fluid, contractions more than  6 per  1 hour, decreased fetal movement, persistent low back pain or cramping, bleeding from vaginal area, difficulty urinating, pain with urination, difficulty breathing, new calf pain, persistent headache, or vision change

## 2022-08-23 NOTE — H&P
place and time.   Ambulatory to unit      Lungs:  Respirations easy and unlabored    Abdomen:   soft, gravid, slightly tender over SP   Fetal position NA due to gestational age   EFW AGA  :  CVA tenderness absent     Fetal heart rate:  Baseline Heart Rate 150's     Pelvis:  External Genitalia: Lesions absent  SSE:  performed no blood or fluid in vault, cervix appears closed, discharge physiologic white/thick  Cervix:  closed, thick and high    Contractions:  none per toco or palpation  Membranes:  intact    UA:  negative except mod bacteria    GBS: unknown      Impression:  29 y.o. [id number] 21w1d lower abdominal pain    Discussed with Dr. Izzy Monson:  Patient reassured, discussed negative UA, discharged to home, call Dr. Glenis Edouard if pain persists or worsens        Electronically signed by JULIANO Rodriguez CNM on 8/22/2022 at 10:00 PM

## 2022-12-02 PROBLEM — O34.219 DESIRES VBAC (VAGINAL BIRTH AFTER CESAREAN) TRIAL: Status: ACTIVE | Noted: 2022-12-02

## 2022-12-02 PROBLEM — O34.219 HISTORY OF CESAREAN DELIVERY, CURRENTLY PREGNANT: Status: ACTIVE | Noted: 2022-12-02

## 2022-12-02 PROBLEM — E66.9 OBESITY (BMI 30.0-34.9): Status: ACTIVE | Noted: 2022-12-02

## 2022-12-02 PROBLEM — E66.811 OBESITY (BMI 30.0-34.9): Status: ACTIVE | Noted: 2022-12-02

## 2022-12-02 PROBLEM — O36.63X0 LARGE FOR DATES AFFECTING MANAGEMENT OF MOTHER, THIRD TRIMESTER, NOT APPLICABLE OR UNSPECIFIED FETUS: Status: ACTIVE | Noted: 2022-12-02

## 2022-12-02 PROBLEM — O09.893 CYSTIC FIBROSIS CARRIER, ANTEPARTUM, THIRD TRIMESTER: Status: ACTIVE | Noted: 2022-12-02

## 2022-12-02 PROBLEM — R87.610 ASCUS WITH POSITIVE HIGH RISK HPV CERVICAL: Status: ACTIVE | Noted: 2022-12-02

## 2022-12-02 PROBLEM — O99.213 OBESITY DURING PREGNANCY IN THIRD TRIMESTER: Status: ACTIVE | Noted: 2022-12-02

## 2022-12-02 PROBLEM — O09.293 HISTORY OF SPONTANEOUS ABORTION, CURRENTLY PREGNANT, THIRD TRIMESTER: Status: ACTIVE | Noted: 2022-12-02

## 2022-12-02 PROBLEM — O09.43 HIGH RISK MULTIGRAVIDA, THIRD TRIMESTER: Status: ACTIVE | Noted: 2022-12-02

## 2022-12-02 PROBLEM — Z14.1 CYSTIC FIBROSIS CARRIER, ANTEPARTUM, THIRD TRIMESTER: Status: ACTIVE | Noted: 2022-12-02

## 2022-12-02 PROBLEM — O09.291 HISTORY OF PRE-ECLAMPSIA IN PRIOR PREGNANCY, CURRENTLY PREGNANT IN FIRST TRIMESTER: Status: ACTIVE | Noted: 2022-12-02

## 2022-12-02 PROBLEM — R87.810 ASCUS WITH POSITIVE HIGH RISK HPV CERVICAL: Status: ACTIVE | Noted: 2022-12-02

## 2022-12-14 ENCOUNTER — HOSPITAL ENCOUNTER (OUTPATIENT)
Age: 32
Setting detail: OBSERVATION
Discharge: HOME | End: 2022-12-14
Attending: OBSTETRICS & GYNECOLOGY | Admitting: OBSTETRICS & GYNECOLOGY
Payer: COMMERCIAL

## 2022-12-14 VITALS
DIASTOLIC BLOOD PRESSURE: 71 MMHG | TEMPERATURE: 98.2 F | HEART RATE: 121 BPM | RESPIRATION RATE: 16 BRPM | SYSTOLIC BLOOD PRESSURE: 118 MMHG

## 2022-12-14 PROBLEM — O23.03 PYELONEPHRITIS AFFECTING PREGNANCY IN THIRD TRIMESTER: Status: RESOLVED | Noted: 2019-04-25 | Resolved: 2022-12-14

## 2022-12-14 PROBLEM — R51.9 PREGNANCY HEADACHE IN THIRD TRIMESTER: Status: ACTIVE | Noted: 2022-12-14

## 2022-12-14 PROBLEM — M54.9 BACK PAIN: Status: RESOLVED | Noted: 2019-04-25 | Resolved: 2022-12-14

## 2022-12-14 PROBLEM — O21.9 VOMITING AFFECTING PREGNANCY, ANTEPARTUM: Status: RESOLVED | Noted: 2019-04-24 | Resolved: 2022-12-14

## 2022-12-14 PROBLEM — O14.93 PREECLAMPSIA, THIRD TRIMESTER: Status: RESOLVED | Noted: 2019-04-29 | Resolved: 2022-12-14

## 2022-12-14 PROBLEM — R07.9 CHEST PAIN: Status: RESOLVED | Noted: 2019-04-25 | Resolved: 2022-12-14

## 2022-12-14 PROBLEM — O26.893 PREGNANCY HEADACHE IN THIRD TRIMESTER: Status: ACTIVE | Noted: 2022-12-14

## 2022-12-14 PROBLEM — O99.891 BACK PAIN AFFECTING PREGNANCY IN THIRD TRIMESTER: Status: ACTIVE | Noted: 2022-12-14

## 2022-12-14 PROBLEM — O36.8130 DECREASED FETAL MOVEMENTS IN THIRD TRIMESTER: Status: ACTIVE | Noted: 2022-12-14

## 2022-12-14 PROBLEM — R10.30 LOWER ABDOMINAL PAIN: Status: RESOLVED | Noted: 2022-08-22 | Resolved: 2022-12-14

## 2022-12-14 PROBLEM — M54.9 BACK PAIN AFFECTING PREGNANCY IN THIRD TRIMESTER: Status: ACTIVE | Noted: 2022-12-14

## 2022-12-14 LAB
ALBUMIN SERPL-MCNC: 3.4 G/DL (ref 3.5–5.2)
ALP BLD-CCNC: 156 U/L (ref 35–104)
ALT SERPL-CCNC: 11 U/L (ref 0–32)
ANION GAP SERPL CALCULATED.3IONS-SCNC: 13 MMOL/L (ref 7–16)
AST SERPL-CCNC: 16 U/L (ref 0–31)
BILIRUB SERPL-MCNC: 0.2 MG/DL (ref 0–1.2)
BILIRUBIN URINE: NEGATIVE
BLOOD, URINE: NEGATIVE
BUN BLDV-MCNC: 9 MG/DL (ref 6–20)
CALCIUM SERPL-MCNC: 9.2 MG/DL (ref 8.6–10.2)
CHLORIDE BLD-SCNC: 100 MMOL/L (ref 98–107)
CLARITY: CLEAR
CO2: 20 MMOL/L (ref 22–29)
COLOR: YELLOW
CREAT SERPL-MCNC: 0.7 MG/DL (ref 0.5–1)
CREATININE URINE: 63 MG/DL (ref 29–226)
GFR SERPL CREATININE-BSD FRML MDRD: >60 ML/MIN/1.73
GLUCOSE BLD-MCNC: 108 MG/DL (ref 74–99)
GLUCOSE URINE: NEGATIVE MG/DL
HCT VFR BLD CALC: 36.3 % (ref 34–48)
HEMOGLOBIN: 11.6 G/DL (ref 11.5–15.5)
KETONES, URINE: NEGATIVE MG/DL
LEUKOCYTE ESTERASE, URINE: NEGATIVE
MCH RBC QN AUTO: 29.1 PG (ref 26–35)
MCHC RBC AUTO-ENTMCNC: 32 % (ref 32–34.5)
MCV RBC AUTO: 91 FL (ref 80–99.9)
NITRITE, URINE: NEGATIVE
PDW BLD-RTO: 12.8 FL (ref 11.5–15)
PH UA: 7 (ref 5–9)
PLATELET # BLD: 262 E9/L (ref 130–450)
PMV BLD AUTO: 9.4 FL (ref 7–12)
POTASSIUM SERPL-SCNC: 4.2 MMOL/L (ref 3.5–5)
PROTEIN PROTEIN: 6 MG/DL (ref 0–12)
PROTEIN UA: NEGATIVE MG/DL
PROTEIN/CREAT RATIO: 0.1
PROTEIN/CREAT RATIO: 0.1 (ref 0–0.2)
RBC # BLD: 3.99 E12/L (ref 3.5–5.5)
SODIUM BLD-SCNC: 133 MMOL/L (ref 132–146)
SPECIFIC GRAVITY UA: 1.01 (ref 1–1.03)
TOTAL PROTEIN: 7 G/DL (ref 6.4–8.3)
URIC ACID, SERUM: 5.5 MG/DL (ref 2.4–5.7)
UROBILINOGEN, URINE: 0.2 E.U./DL
WBC # BLD: 12.6 E9/L (ref 4.5–11.5)

## 2022-12-14 PROCEDURE — 81003 URINALYSIS AUTO W/O SCOPE: CPT

## 2022-12-14 PROCEDURE — 84550 ASSAY OF BLOOD/URIC ACID: CPT

## 2022-12-14 PROCEDURE — 36415 COLL VENOUS BLD VENIPUNCTURE: CPT

## 2022-12-14 PROCEDURE — 80053 COMPREHEN METABOLIC PANEL: CPT

## 2022-12-14 PROCEDURE — 84156 ASSAY OF PROTEIN URINE: CPT

## 2022-12-14 PROCEDURE — 85027 COMPLETE CBC AUTOMATED: CPT

## 2022-12-14 PROCEDURE — G0378 HOSPITAL OBSERVATION PER HR: HCPCS

## 2022-12-14 PROCEDURE — 82570 ASSAY OF URINE CREATININE: CPT

## 2022-12-14 NOTE — PROGRESS NOTES
Patient presents to l&d at 37.3 weeks gestation c/o back pain starting last night and a headache today around 0300 and vomiting. States some blurry vision this morning. Denies lo, vb, and ctx's. +FM. EFM applied.  Orders received from Dr. Fransico Lucio

## 2022-12-14 NOTE — PROGRESS NOTES
L&D DISCHARGE NOTE:    Patient:  Allie Schmitt Date:  12/14/2022  8:42 AM  Medical Record Number:  [id number]   Today's Date: 12/14/2022    S: Woke up this morning with a headache, decreased fetal movement. Has had back pain, reflux through the night and emesis x2. Still has a mild headache, more of a nagging ache in the area of the left temple. Still with some backache, right flank region. Uncomfortable. Denies feeling contractions, leaking fluid and vaginal bleeding. Has no visual disturbances. Has been having some lower extremity swelling, relatively unchanged.     O:   Vitals:    12/14/22 1019 12/14/22 1034 12/14/22 1048 12/14/22 1104   BP: (!) 146/80 130/82 126/75 118/71   Pulse: (!) 116 (!) 117 (!) 121 (!) 121   Resp:       Temp:       TempSrc:         General: Alert and oriented x3, no distress, cooperative, pleasant, resting comfortably  HEENT: Normocephalic, atraumatic, EOMI, PERRLA, MMM  Heart: Mild tachycardia  Lungs: CTA BL  Abdomen: Gravid, nontender, FH equal dates  Back: Spasm in the area of the right external oblique, nontender, no CVA or paraspinal tenderness  Extremity: Trace bilateral pedal edema, pitting; no cords palpable, FROM  Neuro: 2+ deep tendon reflexes bilaterally, negative clonus    Uterus: Gravid, nontender  Cervix: Not reexamined    NST: reactive  Baseline FHR: 130 BPM  Baseline FHR Variability: moderate in degree  Accelerations: present  Periodic or Episodic Decelerations: Absent  Changes or Trends of FHR pattern over time: No significant baseline change  Frequency and intensity of uterine contractions: Irregular, mild     Latest Reference Range & Units 12/14/22 09:00 12/14/22 09:05   Sodium 132 - 146 mmol/L 133    Potassium 3.5 - 5.0 mmol/L 4.2    Chloride 98 - 107 mmol/L 100    CO2 22 - 29 mmol/L 20 (L)    BUN,BUNPL 6 - 20 mg/dL 9    Creatinine 0.5 - 1.0 mg/dL 0.7    Anion Gap 7 - 16 mmol/L 13    Est, Glom Filt Rate >=60 mL/min/1.73 >60    Protein/Creat Ratio -   0.0 - 0.2   0.1  0.1   Glucose, Random 74 - 99 mg/dL 108 (H)    CALCIUM, SERUM, [id number] 8.6 - 10.2 mg/dL 9.2    Total Protein 6.4 - 8.3 g/dL 7.0    Uric Acid, Serum 2.4 - 5.7 mg/dL 5.5    Albumin 3.5 - 5.2 g/dL 3.4 (L)    Alk Phos 35 - 104 U/L 156 (H)    ALT 0 - 32 U/L 11    AST 0 - 31 U/L 16    Bilirubin 0.0 - 1.2 mg/dL 0.2    (L): Data is abnormally low  (H): Data is abnormally high   Latest Reference Range & Units 22 09:05   Color, UA Straw/Yellow  Yellow   Clarity, UA Clear  Clear   Glucose, UA Negative mg/dL Negative   Bilirubin, Urine Negative  Negative   Ketones, Urine Negative mg/dL Negative   Specific Gravity, UA 1.005 - 1.030  1.010   Blood, Urine Negative  Negative   pH, UA 5.0 - 9.0  7.0   Protein, UA Negative mg/dL Negative   Protein, Ur 0 - 12 mg/dL 6   Urobilinogen, Urine <2.0 E.U./dL 0.2   Nitrite, Urine Negative  Negative   Leukocyte Esterase, Urine Negative  Negative       A:32 y.o. W female at 44w3d  for NST due to decreased fetal movement  Decreased fetal movement this morning, NST reactive  Headache in pregnancy  Back pain in pregnancy -muscle spasm right external oblique  History of preeclampsia in prior pregnancy  Patient Active Problem List   Diagnosis    Genital herpes    PCOS (polycystic ovarian syndrome)    Hypogonadotropic hypogonadism syndrome, female ([facility]    37 3/7 weeks gestation of pregnancy    High risk multigravida, third trimester    History of  delivery, currently pregnant (fetal intolernce of labor s/p 37w IOL for Preeclampsia)- Repeat LTCS scheduled 21 0700 if labor does not take place sooner    Desires  (vaginal birth after ) trial    Large for dates affecting management of mother, third trimester, not applicable or unspecified fetus (FH>D @ 32w; 34w US (6# 8oz) 82.3%)    Obesity (BMI 30.0-34. 9) Pregatrvid BMI 31.14    Obesity during pregnancy in third trimester    History of pre-eclampsia in prior pregnancy, currently pregnant in first trimester started on baby asa    ASCUS with positive high risk HPV6/29/22 antepartum Pap cervical -repeat  colp PP    History of spontaneous , currently pregnant, third trimester    Cystic fibrosis carrier, antepartum, third trimester    Pregnancy headache in third trimester    Decreased fetal movements in third trimester    Back pain affecting pregnancy in third trimester - spasm rt ext oblique     NST results: reactive    P: Dr. Arnulfo Cardona MD notified of fetal status. Discharge home with instructions and precautions. Fetal movement counts daily, call immediately if decreased  May use Tylenol and try caffeine for headache. Tylenol and moist heat to the back. Utilize massage to the area. PIH precautions -call with persistent or severe headache, visual changes, rapid weight gain//swelling, midepigastric/right upper quadrant pain. Labor precautions. Call with regular persistent contractions, leaking fluid or vaginal bleeding. Keep next appointment with Dr. Arnulfo Cardona MD.    Arnulfo Cardona MD, M.D.  FACOG  2022 11:31 AM

## 2022-12-14 NOTE — H&P
Department of Obstetrics and Gynecology  Labor and Delivery  Nurse practitioner Triage Note      SUBJECTIVE:  Aurelia Herring is a 29 y.o. female, , Patient's last menstrual period was 2022 (exact date). ,Estimated Date of Delivery: 23, 37w3d, with the complaint of headache, vomiting this morning and  blurred vision. She reports low back pain. Denies lof, vb or decreased fm. Pt denies epigastric pain or increased swelling. Hx of preeclampsia in prior pregnancy. Previous c.s. planning to  this pregnancy. Prenatal course: hx of preeclampsia in prior preg    MEDICAL HISTORY:      Diagnosis Date    Abdominal pain     Abnormal Pap smear of cervix 2018    with Dr Hiram Kang     Esophageal spasm     Genital herpes 2014    as of 2015 / no active breakout    GERD (gastroesophageal reflux disease)     History of PCOS     Hypogonadotropic hypogonadism syndrome, female ([facility] 2014    Exercise induced. SURGICAL HISTORY:      Procedure Laterality Date    BLADDER SURGERY       SECTION N/A 2019     SECTION performed by Leigh Ann Buck MD at [facility] L&D OR    ENDOSCOPY, COLON, DIAGNOSTIC  2015       FAMILY HISTORY      Problem Relation Age of Onset    No Known Problems Mother     Prostate Cancer Father     High Cholesterol Father        Medication prior to Admission:  Medications Prior to Admission: valACYclovir (VALTREX) 500 MG tablet, Take 1 tablet by mouth 2 times daily  clindamycin (CLEOCIN) 2 % vaginal cream, Place vaginally nightly for 7 doses (Patient not taking: No sig reported)  Prenatal Vit-Fe Fumarate-FA (PRENATAL PO), Take by mouth  aspirin 81 MG EC tablet, Take 81 mg by mouth in the morning. ALLERGIES:  Food, Bactrim [sulfamethoxazole-trimethoprim], Pcn [penicillins], Penicillin g, and Doxycycline    SOCIAL HISTORY:  TOBACCO:   reports that she has never smoked.  She has never used smokeless tobacco.        Review of Systems:   Ears, nose, mouth, throat, and face: negative  Respiratory: negative  Cardiovascular: negative  Gastrointestinal: positive for vomiting  Genitourinary:negative  Integument/breast: negative  Hematologic/lymphatic: negative  Musculoskeletal:negative  Neurological: positive for headaches and blurred vision  Behavioral/Psych: negative  Endocrine: negative  Allergic/Immunologic: negative    OBJECTIVE    Vitals:  /82   Pulse (!) 120   Temp 98.2 °F (36.8 °C) (Oral)   Resp 16   LMP 02/21/2022 (Exact Date)       NECK:  Supple, symmetrical, trachea midline, no adenopathy, thyroid symmetric, not enlarged and no tenderness, skin normal  LUNGS:  No increased work of breathing, good air exchange, clear to auscultation bilaterally, no crackles or wheezing  CARDIOVASCULAR:  normal S1 and S2  ABDOMEN:  soft, nontender    Cervix:             Dilation:  1-2 cm         Effacement:  75%         Station:  -2 cm         Consistency:  soft         Position:  posterior               Fetal Position:  Cephalic    Membranes:  Intact    Fetal heart rate:         Baseline Heart Rate:  130        Accelerations:  present       Decelerations: absent       Variability:  moderate    Contraction frequency: 2-3 minutes        General Labs:    CBC:   Lab Results   Component Value Date/Time    WBC 12.7 11/21/2022 12:00 PM    RBC 3.70 11/21/2022 12:00 PM    RBC 4.28 03/09/2020 04:21 PM    HGB 11.0 11/21/2022 12:00 PM    HCT 34.3 11/21/2022 12:00 PM    MCV 92.7 11/21/2022 12:00 PM    RDW 12.9 11/21/2022 12:00 PM     11/21/2022 12:00 PM     CMP:    Lab Results   Component Value Date/Time     09/22/2022 11:10 AM    K 4.1 09/22/2022 11:10 AM     09/22/2022 11:10 AM    CO2 24 09/22/2022 11:10 AM    BUN 10 09/22/2022 11:10 AM    PROT 7.3 09/22/2022 11:10 AM     U/A:  No components found for: Adilson Rowan, USPGRAV, UPH, UPROTEIN, UGLUCOSE, UKETONE, UBILI, UBLOOD, UNITRITE, UUROBIL, ULEUKEST, USQEPI, URENEPI, UWBC, URBC, Synchari, YULISA        ASSESSMENT & PLAN:   Observe  Cycle bps  Cbc  Cmp  Uric acid  Urine pr/cr ratio

## 2022-12-14 NOTE — PROGRESS NOTES
Dr Andrew Ng at nurses station, labs and Bps reviewed. Dr Adelaida Harman spoke with patient. Patient okay to be discharged home.

## 2022-12-16 PROBLEM — Z3A.37 37 WEEKS GESTATION OF PREGNANCY: Status: RESOLVED | Noted: 2019-04-29 | Resolved: 2022-12-16

## 2022-12-22 PROBLEM — B00.9 HERPES SIMPLEX INFECTION IN MOTHER DURING THIRD TRIMESTER OF PREGNANCY: Status: ACTIVE | Noted: 2022-12-22

## 2022-12-22 PROBLEM — O98.513 HERPES SIMPLEX INFECTION IN MOTHER DURING THIRD TRIMESTER OF PREGNANCY: Status: ACTIVE | Noted: 2022-12-22

## 2022-12-28 PROBLEM — O09.293 HISTORY OF PRE-ECLAMPSIA IN PRIOR PREGNANCY, CURRENTLY PREGNANT, THIRD TRIMESTER: Status: ACTIVE | Noted: 2022-12-02

## 2022-12-28 PROBLEM — Z13.79 GENETIC TESTING: Status: ACTIVE | Noted: 2022-12-28

## 2022-12-30 ENCOUNTER — HOSPITAL ENCOUNTER (INPATIENT)
Age: 32
LOS: 3 days | Discharge: HOME | End: 2023-01-02
Attending: OBSTETRICS & GYNECOLOGY | Admitting: OBSTETRICS & GYNECOLOGY
Payer: COMMERCIAL

## 2022-12-30 ENCOUNTER — ANESTHESIA (OUTPATIENT)
Dept: LABOR AND DELIVERY | Age: 32
End: 2022-12-30
Payer: COMMERCIAL

## 2022-12-30 ENCOUNTER — ANESTHESIA EVENT (OUTPATIENT)
Dept: LABOR AND DELIVERY | Age: 32
End: 2022-12-30
Payer: COMMERCIAL

## 2022-12-30 DIAGNOSIS — G89.18 POSTOPERATIVE PAIN: ICD-10-CM

## 2022-12-30 PROBLEM — O99.891 BACK PAIN AFFECTING PREGNANCY IN THIRD TRIMESTER: Status: RESOLVED | Noted: 2022-12-14 | Resolved: 2022-12-30

## 2022-12-30 PROBLEM — O36.8130 DECREASED FETAL MOVEMENTS IN THIRD TRIMESTER: Status: RESOLVED | Noted: 2022-12-14 | Resolved: 2022-12-30

## 2022-12-30 PROBLEM — M54.9 BACK PAIN AFFECTING PREGNANCY IN THIRD TRIMESTER: Status: RESOLVED | Noted: 2022-12-14 | Resolved: 2022-12-30

## 2022-12-30 PROBLEM — R51.9 PREGNANCY HEADACHE IN THIRD TRIMESTER: Status: RESOLVED | Noted: 2022-12-14 | Resolved: 2022-12-30

## 2022-12-30 PROBLEM — Z3A.39 39 WEEKS GESTATION OF PREGNANCY: Status: ACTIVE | Noted: 2022-12-30

## 2022-12-30 PROBLEM — O26.893 PREGNANCY HEADACHE IN THIRD TRIMESTER: Status: RESOLVED | Noted: 2022-12-14 | Resolved: 2022-12-30

## 2022-12-30 LAB
ABO/RH: NORMAL
AMPHETAMINE SCREEN, URINE: NOT DETECTED
ANTIBODY SCREEN: NORMAL
BARBITURATE SCREEN URINE: NOT DETECTED
BENZODIAZEPINE SCREEN, URINE: NOT DETECTED
CANNABINOID SCREEN URINE: NOT DETECTED
COCAINE METABOLITE SCREEN URINE: NOT DETECTED
FENTANYL SCREEN, URINE: NOT DETECTED
HCT VFR BLD CALC: 35.9 % (ref 34–48)
HEMOGLOBIN: 12.1 G/DL (ref 11.5–15.5)
Lab: NORMAL
MCH RBC QN AUTO: 29.8 PG (ref 26–35)
MCHC RBC AUTO-ENTMCNC: 33.7 % (ref 32–34.5)
MCV RBC AUTO: 88.4 FL (ref 80–99.9)
METHADONE SCREEN, URINE: NOT DETECTED
OPIATE SCREEN URINE: NOT DETECTED
OXYCODONE URINE: NOT DETECTED
PDW BLD-RTO: 13.1 FL (ref 11.5–15)
PHENCYCLIDINE SCREEN URINE: NOT DETECTED
PLATELET # BLD: 231 E9/L (ref 130–450)
PMV BLD AUTO: 9.4 FL (ref 7–12)
RBC # BLD: 4.06 E12/L (ref 3.5–5.5)
WBC # BLD: 11.8 E9/L (ref 4.5–11.5)

## 2022-12-30 PROCEDURE — 86900 BLOOD TYPING SEROLOGIC ABO: CPT

## 2022-12-30 PROCEDURE — 2709999900 HC NON-CHARGEABLE SUPPLY: Performed by: OBSTETRICS & GYNECOLOGY

## 2022-12-30 PROCEDURE — 7100000000 HC PACU RECOVERY - FIRST 15 MIN: Performed by: OBSTETRICS & GYNECOLOGY

## 2022-12-30 PROCEDURE — 2580000003 HC RX 258

## 2022-12-30 PROCEDURE — 6360000002 HC RX W HCPCS

## 2022-12-30 PROCEDURE — 80307 DRUG TEST PRSMV CHEM ANLYZR: CPT

## 2022-12-30 PROCEDURE — 99221 1ST HOSP IP/OBS SF/LOW 40: CPT | Performed by: ADVANCED PRACTICE MIDWIFE

## 2022-12-30 PROCEDURE — 36415 COLL VENOUS BLD VENIPUNCTURE: CPT

## 2022-12-30 PROCEDURE — 85027 COMPLETE CBC AUTOMATED: CPT

## 2022-12-30 PROCEDURE — 6360000002 HC RX W HCPCS: Performed by: ANESTHESIOLOGY

## 2022-12-30 PROCEDURE — 7100000001 HC PACU RECOVERY - ADDTL 15 MIN: Performed by: OBSTETRICS & GYNECOLOGY

## 2022-12-30 PROCEDURE — 86850 RBC ANTIBODY SCREEN: CPT

## 2022-12-30 PROCEDURE — 3609079900 HC CESAREAN SECTION: Performed by: OBSTETRICS & GYNECOLOGY

## 2022-12-30 PROCEDURE — 2580000003 HC RX 258: Performed by: OBSTETRICS & GYNECOLOGY

## 2022-12-30 PROCEDURE — 86901 BLOOD TYPING SEROLOGIC RH(D): CPT

## 2022-12-30 PROCEDURE — 6370000000 HC RX 637 (ALT 250 FOR IP): Performed by: OBSTETRICS & GYNECOLOGY

## 2022-12-30 PROCEDURE — 3700000001 HC ADD 15 MINUTES (ANESTHESIA): Performed by: OBSTETRICS & GYNECOLOGY

## 2022-12-30 PROCEDURE — 3700000000 HC ANESTHESIA ATTENDED CARE: Performed by: OBSTETRICS & GYNECOLOGY

## 2022-12-30 PROCEDURE — 2500000003 HC RX 250 WO HCPCS

## 2022-12-30 PROCEDURE — 6370000000 HC RX 637 (ALT 250 FOR IP)

## 2022-12-30 PROCEDURE — 1220000000 HC SEMI PRIVATE OB R&B

## 2022-12-30 PROCEDURE — 6360000002 HC RX W HCPCS: Performed by: OBSTETRICS & GYNECOLOGY

## 2022-12-30 PROCEDURE — A4216 STERILE WATER/SALINE, 10 ML: HCPCS | Performed by: OBSTETRICS & GYNECOLOGY

## 2022-12-30 PROCEDURE — 6370000000 HC RX 637 (ALT 250 FOR IP): Performed by: ANESTHESIOLOGY

## 2022-12-30 PROCEDURE — 2500000003 HC RX 250 WO HCPCS: Performed by: OBSTETRICS & GYNECOLOGY

## 2022-12-30 RX ORDER — SODIUM CHLORIDE 0.9 % (FLUSH) 0.9 %
5-40 SYRINGE (ML) INJECTION EVERY 12 HOURS SCHEDULED
Status: DISCONTINUED | OUTPATIENT
Start: 2022-12-30 | End: 2023-01-02 | Stop reason: HOSPADM

## 2022-12-30 RX ORDER — MEPERIDINE HYDROCHLORIDE 25 MG/ML
12.5 INJECTION INTRAMUSCULAR; INTRAVENOUS; SUBCUTANEOUS EVERY 5 MIN PRN
Status: DISCONTINUED | OUTPATIENT
Start: 2022-12-30 | End: 2022-12-30 | Stop reason: HOSPADM

## 2022-12-30 RX ORDER — NALOXONE HYDROCHLORIDE 0.4 MG/ML
INJECTION, SOLUTION INTRAMUSCULAR; INTRAVENOUS; SUBCUTANEOUS PRN
Status: ACTIVE | OUTPATIENT
Start: 2022-12-30 | End: 2022-12-31

## 2022-12-30 RX ORDER — WATER 1000 ML/1000ML
INJECTION, SOLUTION INTRAVENOUS
Status: DISPENSED
Start: 2022-12-30 | End: 2022-12-30

## 2022-12-30 RX ORDER — SODIUM CHLORIDE 0.9 % (FLUSH) 0.9 %
10 SYRINGE (ML) INJECTION EVERY 12 HOURS SCHEDULED
Status: DISCONTINUED | OUTPATIENT
Start: 2022-12-30 | End: 2022-12-30 | Stop reason: SDUPTHER

## 2022-12-30 RX ORDER — KETOROLAC TROMETHAMINE 30 MG/ML
30 INJECTION, SOLUTION INTRAMUSCULAR; INTRAVENOUS EVERY 6 HOURS PRN
Status: DISPENSED | OUTPATIENT
Start: 2022-12-30 | End: 2022-12-31

## 2022-12-30 RX ORDER — SODIUM CHLORIDE, SODIUM LACTATE, POTASSIUM CHLORIDE, CALCIUM CHLORIDE 600; 310; 30; 20 MG/100ML; MG/100ML; MG/100ML; MG/100ML
INJECTION, SOLUTION INTRAVENOUS CONTINUOUS PRN
Status: DISCONTINUED | OUTPATIENT
Start: 2022-12-30 | End: 2022-12-30 | Stop reason: SDUPTHER

## 2022-12-30 RX ORDER — SODIUM CHLORIDE 0.9 % (FLUSH) 0.9 %
5-40 SYRINGE (ML) INJECTION EVERY 12 HOURS SCHEDULED
Status: DISCONTINUED | OUTPATIENT
Start: 2022-12-30 | End: 2022-12-30 | Stop reason: HOSPADM

## 2022-12-30 RX ORDER — CLINDAMYCIN PHOSPHATE 900 MG/50ML
INJECTION INTRAVENOUS
Status: COMPLETED
Start: 2022-12-30 | End: 2022-12-30

## 2022-12-30 RX ORDER — SIMETHICONE 80 MG
80 TABLET,CHEWABLE ORAL EVERY 6 HOURS PRN
Status: DISCONTINUED | OUTPATIENT
Start: 2022-12-30 | End: 2023-01-02 | Stop reason: HOSPADM

## 2022-12-30 RX ORDER — CLINDAMYCIN PHOSPHATE 900 MG/50ML
900 INJECTION INTRAVENOUS
Status: COMPLETED | OUTPATIENT
Start: 2022-12-30 | End: 2022-12-30

## 2022-12-30 RX ORDER — ACETAMINOPHEN 325 MG/1
650 TABLET ORAL EVERY 6 HOURS
Status: DISPENSED | OUTPATIENT
Start: 2022-12-30 | End: 2022-12-31

## 2022-12-30 RX ORDER — FENTANYL CITRATE 50 UG/ML
25 INJECTION, SOLUTION INTRAMUSCULAR; INTRAVENOUS EVERY 5 MIN PRN
Status: DISCONTINUED | OUTPATIENT
Start: 2022-12-30 | End: 2022-12-30 | Stop reason: HOSPADM

## 2022-12-30 RX ORDER — IBUPROFEN 200 MG
600 TABLET ORAL EVERY 6 HOURS PRN
Status: DISCONTINUED | OUTPATIENT
Start: 2022-12-31 | End: 2023-01-02 | Stop reason: HOSPADM

## 2022-12-30 RX ORDER — OXYCODONE HYDROCHLORIDE 5 MG/1
10 TABLET ORAL EVERY 4 HOURS PRN
Status: DISCONTINUED | OUTPATIENT
Start: 2022-12-31 | End: 2023-01-02 | Stop reason: HOSPADM

## 2022-12-30 RX ORDER — PROCHLORPERAZINE EDISYLATE 5 MG/ML
5 INJECTION INTRAMUSCULAR; INTRAVENOUS
Status: DISCONTINUED | OUTPATIENT
Start: 2022-12-30 | End: 2022-12-30 | Stop reason: HOSPADM

## 2022-12-30 RX ORDER — CEFAZOLIN 2 G/1
INJECTION, POWDER, FOR SOLUTION INTRAMUSCULAR; INTRAVENOUS
Status: DISCONTINUED
Start: 2022-12-30 | End: 2022-12-30 | Stop reason: WASHOUT

## 2022-12-30 RX ORDER — DOCUSATE SODIUM 100 MG/1
100 CAPSULE, LIQUID FILLED ORAL 2 TIMES DAILY
Status: DISCONTINUED | OUTPATIENT
Start: 2022-12-30 | End: 2023-01-02 | Stop reason: HOSPADM

## 2022-12-30 RX ORDER — ONDANSETRON 2 MG/ML
4 INJECTION INTRAMUSCULAR; INTRAVENOUS EVERY 6 HOURS PRN
Status: DISCONTINUED | OUTPATIENT
Start: 2022-12-30 | End: 2023-01-02 | Stop reason: HOSPADM

## 2022-12-30 RX ORDER — SODIUM CHLORIDE 0.9 % (FLUSH) 0.9 %
5-40 SYRINGE (ML) INJECTION PRN
Status: DISCONTINUED | OUTPATIENT
Start: 2022-12-30 | End: 2023-01-02 | Stop reason: HOSPADM

## 2022-12-30 RX ORDER — OXYCODONE HYDROCHLORIDE 5 MG/1
5 TABLET ORAL EVERY 4 HOURS PRN
Status: DISCONTINUED | OUTPATIENT
Start: 2022-12-31 | End: 2023-01-02 | Stop reason: HOSPADM

## 2022-12-30 RX ORDER — MORPHINE SULFATE 1 MG/ML
INJECTION, SOLUTION EPIDURAL; INTRATHECAL; INTRAVENOUS PRN
Status: DISCONTINUED | OUTPATIENT
Start: 2022-12-30 | End: 2022-12-30 | Stop reason: SDUPTHER

## 2022-12-30 RX ORDER — SODIUM CHLORIDE 9 MG/ML
INJECTION, SOLUTION INTRAVENOUS PRN
Status: DISCONTINUED | OUTPATIENT
Start: 2022-12-30 | End: 2022-12-30 | Stop reason: HOSPADM

## 2022-12-30 RX ORDER — DIPHENHYDRAMINE HYDROCHLORIDE 50 MG/ML
12.5 INJECTION INTRAMUSCULAR; INTRAVENOUS EVERY 4 HOURS PRN
Status: ACTIVE | OUTPATIENT
Start: 2022-12-30 | End: 2022-12-31

## 2022-12-30 RX ORDER — ONDANSETRON 2 MG/ML
INJECTION INTRAMUSCULAR; INTRAVENOUS PRN
Status: DISCONTINUED | OUTPATIENT
Start: 2022-12-30 | End: 2022-12-30 | Stop reason: SDUPTHER

## 2022-12-30 RX ORDER — KETOROLAC TROMETHAMINE 30 MG/ML
INJECTION, SOLUTION INTRAMUSCULAR; INTRAVENOUS PRN
Status: DISCONTINUED | OUTPATIENT
Start: 2022-12-30 | End: 2022-12-30 | Stop reason: SDUPTHER

## 2022-12-30 RX ORDER — BISACODYL 10 MG
10 SUPPOSITORY, RECTAL RECTAL DAILY PRN
Status: DISCONTINUED | OUTPATIENT
Start: 2023-01-01 | End: 2023-01-02 | Stop reason: HOSPADM

## 2022-12-30 RX ORDER — FERROUS SULFATE 325(65) MG
325 TABLET ORAL 2 TIMES DAILY WITH MEALS
Status: DISCONTINUED | OUTPATIENT
Start: 2022-12-30 | End: 2023-01-02 | Stop reason: HOSPADM

## 2022-12-30 RX ORDER — CLINDAMYCIN PHOSPHATE 900 MG/50ML
900 INJECTION INTRAVENOUS EVERY 8 HOURS
Status: DISPENSED | OUTPATIENT
Start: 2022-12-30 | End: 2022-12-31

## 2022-12-30 RX ORDER — MORPHINE SULFATE 2 MG/ML
2 INJECTION, SOLUTION INTRAMUSCULAR; INTRAVENOUS EVERY 5 MIN PRN
Status: DISCONTINUED | OUTPATIENT
Start: 2022-12-30 | End: 2022-12-30 | Stop reason: HOSPADM

## 2022-12-30 RX ORDER — ASPIRIN 81 MG/1
81 TABLET ORAL
Status: DISCONTINUED | OUTPATIENT
Start: 2022-12-31 | End: 2023-01-02 | Stop reason: HOSPADM

## 2022-12-30 RX ORDER — SODIUM CHLORIDE 9 MG/ML
INJECTION, SOLUTION INTRAVENOUS PRN
Status: DISCONTINUED | OUTPATIENT
Start: 2022-12-30 | End: 2023-01-02 | Stop reason: HOSPADM

## 2022-12-30 RX ORDER — BUPIVACAINE HYDROCHLORIDE 7.5 MG/ML
INJECTION, SOLUTION INTRASPINAL PRN
Status: DISCONTINUED | OUTPATIENT
Start: 2022-12-30 | End: 2022-12-30 | Stop reason: SDUPTHER

## 2022-12-30 RX ORDER — SODIUM CHLORIDE 0.9 % (FLUSH) 0.9 %
5-40 SYRINGE (ML) INJECTION PRN
Status: DISCONTINUED | OUTPATIENT
Start: 2022-12-30 | End: 2022-12-30 | Stop reason: HOSPADM

## 2022-12-30 RX ORDER — SODIUM CHLORIDE 9 MG/ML
INJECTION, SOLUTION INTRAVENOUS PRN
Status: DISCONTINUED | OUTPATIENT
Start: 2022-12-30 | End: 2022-12-30 | Stop reason: SDUPTHER

## 2022-12-30 RX ORDER — MODIFIED LANOLIN
OINTMENT (GRAM) TOPICAL
Status: DISCONTINUED | OUTPATIENT
Start: 2022-12-30 | End: 2023-01-02 | Stop reason: HOSPADM

## 2022-12-30 RX ORDER — SODIUM CHLORIDE, SODIUM LACTATE, POTASSIUM CHLORIDE, CALCIUM CHLORIDE 600; 310; 30; 20 MG/100ML; MG/100ML; MG/100ML; MG/100ML
INJECTION, SOLUTION INTRAVENOUS CONTINUOUS
Status: DISCONTINUED | OUTPATIENT
Start: 2022-12-30 | End: 2022-12-30 | Stop reason: SDUPTHER

## 2022-12-30 RX ORDER — TRISODIUM CITRATE DIHYDRATE AND CITRIC ACID MONOHYDRATE 500; 334 MG/5ML; MG/5ML
30 SOLUTION ORAL ONCE
Status: COMPLETED | OUTPATIENT
Start: 2022-12-30 | End: 2022-12-30

## 2022-12-30 RX ORDER — DIPHENHYDRAMINE HCL 25 MG
25 TABLET ORAL EVERY 6 HOURS PRN
Status: ACTIVE | OUTPATIENT
Start: 2022-12-30 | End: 2022-12-31

## 2022-12-30 RX ORDER — PRENATAL WITH FERROUS FUM AND FOLIC ACID 3080; 920; 120; 400; 22; 1.84; 3; 20; 10; 1; 12; 200; 27; 25; 2 [IU]/1; [IU]/1; MG/1; [IU]/1; MG/1; MG/1; MG/1; MG/1; MG/1; MG/1; UG/1; MG/1; MG/1; MG/1; MG/1
1 TABLET ORAL
Status: DISCONTINUED | OUTPATIENT
Start: 2022-12-31 | End: 2023-01-02 | Stop reason: HOSPADM

## 2022-12-30 RX ORDER — PHENYLEPHRINE HCL IN 0.9% NACL 1 MG/10 ML
SYRINGE (ML) INTRAVENOUS PRN
Status: DISCONTINUED | OUTPATIENT
Start: 2022-12-30 | End: 2022-12-30 | Stop reason: SDUPTHER

## 2022-12-30 RX ORDER — ACETAMINOPHEN 500 MG
1000 TABLET ORAL EVERY 6 HOURS PRN
Status: DISCONTINUED | OUTPATIENT
Start: 2022-12-31 | End: 2023-01-02 | Stop reason: HOSPADM

## 2022-12-30 RX ORDER — SODIUM CHLORIDE, SODIUM LACTATE, POTASSIUM CHLORIDE, CALCIUM CHLORIDE 600; 310; 30; 20 MG/100ML; MG/100ML; MG/100ML; MG/100ML
INJECTION, SOLUTION INTRAVENOUS CONTINUOUS
Status: DISCONTINUED | OUTPATIENT
Start: 2022-12-30 | End: 2023-01-02 | Stop reason: HOSPADM

## 2022-12-30 RX ORDER — TRISODIUM CITRATE DIHYDRATE AND CITRIC ACID MONOHYDRATE 500; 334 MG/5ML; MG/5ML
SOLUTION ORAL
Status: COMPLETED
Start: 2022-12-30 | End: 2022-12-30

## 2022-12-30 RX ORDER — SODIUM CHLORIDE 0.9 % (FLUSH) 0.9 %
10 SYRINGE (ML) INJECTION PRN
Status: DISCONTINUED | OUTPATIENT
Start: 2022-12-30 | End: 2022-12-30 | Stop reason: SDUPTHER

## 2022-12-30 RX ORDER — OXYCODONE HYDROCHLORIDE 5 MG/1
5 TABLET ORAL EVERY 4 HOURS PRN
Status: DISPENSED | OUTPATIENT
Start: 2022-12-30 | End: 2022-12-31

## 2022-12-30 RX ORDER — SODIUM CHLORIDE, SODIUM LACTATE, POTASSIUM CHLORIDE, AND CALCIUM CHLORIDE .6; .31; .03; .02 G/100ML; G/100ML; G/100ML; G/100ML
1000 INJECTION, SOLUTION INTRAVENOUS ONCE
Status: DISCONTINUED | OUTPATIENT
Start: 2022-12-30 | End: 2022-12-31

## 2022-12-30 RX ORDER — DIPHENHYDRAMINE HYDROCHLORIDE 50 MG/ML
12.5 INJECTION INTRAMUSCULAR; INTRAVENOUS
Status: COMPLETED | OUTPATIENT
Start: 2022-12-30 | End: 2022-12-30

## 2022-12-30 RX ADMIN — Medication 200 MCG: at 09:41

## 2022-12-30 RX ADMIN — Medication 100 MCG: at 09:43

## 2022-12-30 RX ADMIN — Medication 200 MCG: at 09:29

## 2022-12-30 RX ADMIN — CLINDAMYCIN PHOSPHATE 900 MG: 900 INJECTION INTRAVENOUS at 08:30

## 2022-12-30 RX ADMIN — TRISODIUM CITRATE DIHYDRATE AND CITRIC ACID MONOHYDRATE 30 ML: 500; 334 SOLUTION ORAL at 09:00

## 2022-12-30 RX ADMIN — SODIUM CHLORIDE, POTASSIUM CHLORIDE, SODIUM LACTATE AND CALCIUM CHLORIDE: 600; 310; 30; 20 INJECTION, SOLUTION INTRAVENOUS at 11:04

## 2022-12-30 RX ADMIN — SODIUM CHLORIDE, PRESERVATIVE FREE 20 MG: 5 INJECTION INTRAVENOUS at 20:46

## 2022-12-30 RX ADMIN — Medication 200 MCG: at 09:26

## 2022-12-30 RX ADMIN — Medication 200 MCG: at 09:30

## 2022-12-30 RX ADMIN — GENTAMICIN SULFATE 500 MG: 40 INJECTION, SOLUTION INTRAMUSCULAR; INTRAVENOUS at 09:02

## 2022-12-30 RX ADMIN — OXYCODONE HYDROCHLORIDE 5 MG: 5 TABLET ORAL at 18:52

## 2022-12-30 RX ADMIN — Medication 200 MCG: at 09:55

## 2022-12-30 RX ADMIN — Medication 100 MCG: at 09:13

## 2022-12-30 RX ADMIN — SODIUM CHLORIDE, POTASSIUM CHLORIDE, SODIUM LACTATE AND CALCIUM CHLORIDE: 600; 310; 30; 20 INJECTION, SOLUTION INTRAVENOUS at 10:18

## 2022-12-30 RX ADMIN — CLINDAMYCIN PHOSPHATE 900 MG: 900 INJECTION, SOLUTION INTRAVENOUS at 18:57

## 2022-12-30 RX ADMIN — CLINDAMYCIN IN 5 PERCENT DEXTROSE 900 MG: 18 INJECTION, SOLUTION INTRAVENOUS at 08:30

## 2022-12-30 RX ADMIN — Medication 100 MCG: at 09:22

## 2022-12-30 RX ADMIN — KETOROLAC TROMETHAMINE 30 MG: 30 INJECTION, SOLUTION INTRAMUSCULAR at 10:27

## 2022-12-30 RX ADMIN — OXYCODONE HYDROCHLORIDE 5 MG: 5 TABLET ORAL at 14:53

## 2022-12-30 RX ADMIN — ONDANSETRON 4 MG: 2 INJECTION INTRAMUSCULAR; INTRAVENOUS at 09:39

## 2022-12-30 RX ADMIN — DIPHENHYDRAMINE HYDROCHLORIDE 12.5 MG: 50 INJECTION, SOLUTION INTRAMUSCULAR; INTRAVENOUS at 11:42

## 2022-12-30 RX ADMIN — KETOROLAC TROMETHAMINE 30 MG: 30 INJECTION, SOLUTION INTRAMUSCULAR; INTRAVENOUS at 17:07

## 2022-12-30 RX ADMIN — SODIUM CHLORIDE, POTASSIUM CHLORIDE, SODIUM LACTATE AND CALCIUM CHLORIDE: 600; 310; 30; 20 INJECTION, SOLUTION INTRAVENOUS at 09:28

## 2022-12-30 RX ADMIN — SODIUM CHLORIDE, PRESERVATIVE FREE 10 ML: 5 INJECTION INTRAVENOUS at 20:47

## 2022-12-30 RX ADMIN — OXYCODONE HYDROCHLORIDE 5 MG: 5 TABLET ORAL at 22:56

## 2022-12-30 RX ADMIN — DOCUSATE SODIUM 100 MG: 100 CAPSULE, LIQUID FILLED ORAL at 20:42

## 2022-12-30 RX ADMIN — BUPIVACAINE HYDROCHLORIDE 1.7 ML: 7.5 INJECTION, SOLUTION SUBARACHNOID at 09:10

## 2022-12-30 RX ADMIN — SODIUM CITRATE AND CITRIC ACID MONOHYDRATE 30 ML: 500; 334 SOLUTION ORAL at 09:00

## 2022-12-30 RX ADMIN — Medication 909 ML/HR: at 09:38

## 2022-12-30 RX ADMIN — Medication 100 MCG: at 09:16

## 2022-12-30 RX ADMIN — SODIUM CHLORIDE, POTASSIUM CHLORIDE, SODIUM LACTATE AND CALCIUM CHLORIDE: 600; 310; 30; 20 INJECTION, SOLUTION INTRAVENOUS at 09:02

## 2022-12-30 RX ADMIN — MORPHINE SULFATE 0.15 MG: 1 INJECTION, SOLUTION EPIDURAL; INTRATHECAL; INTRAVENOUS at 09:10

## 2022-12-30 RX ADMIN — ACETAMINOPHEN 650 MG: 325 TABLET ORAL at 17:49

## 2022-12-30 ASSESSMENT — PAIN SCALES - GENERAL
PAINLEVEL_OUTOF10: 5
PAINLEVEL_OUTOF10: 4
PAINLEVEL_OUTOF10: 0
PAINLEVEL_OUTOF10: 6
PAINLEVEL_OUTOF10: 1
PAINLEVEL_OUTOF10: 6

## 2022-12-30 ASSESSMENT — PAIN DESCRIPTION - DESCRIPTORS: DESCRIPTORS: ACHING;THROBBING

## 2022-12-30 ASSESSMENT — PAIN DESCRIPTION - ORIENTATION: ORIENTATION: LOWER

## 2022-12-30 ASSESSMENT — PAIN DESCRIPTION - PAIN TYPE
TYPE: ACUTE PAIN
TYPE: ACUTE PAIN

## 2022-12-30 ASSESSMENT — PAIN DESCRIPTION - FREQUENCY
FREQUENCY: INTERMITTENT
FREQUENCY: INTERMITTENT

## 2022-12-30 ASSESSMENT — PAIN DESCRIPTION - LOCATION
LOCATION: ABDOMEN
LOCATION: INCISION
LOCATION: ABDOMEN

## 2022-12-30 ASSESSMENT — LIFESTYLE VARIABLES: SMOKING_STATUS: 0

## 2022-12-30 ASSESSMENT — PAIN - FUNCTIONAL ASSESSMENT: PAIN_FUNCTIONAL_ASSESSMENT: ACTIVITIES ARE NOT PREVENTED

## 2022-12-30 NOTE — OP NOTE
PreOp DX:  39w5d Pregnancy     Previous C/S         Post OP Dx:  Same + live male                             Procedure:   section / Assistance     Anesthesia:  Spinal     EBL: 500 cc        Under spinal anesthesia the abdomen was prepared and draped . In the absence of a resident I assisted  Dr. Eliazar Ruby who performed a  section, with retraction, exposure and delivery of a live infant and suture management/cutting throughout the case. Then the uterus and the abdomen were closed. Procedure was well tolerated.

## 2022-12-30 NOTE — ANESTHESIA PRE PROCEDURE
Department of Anesthesiology  Preprocedure Note       Name:  Sarah Aragon   Age:  29 y.o.  :  1990                                          MRN:  [id number]         Date:  2022      Surgeon: Ben Reed):  Adilson Francis MD    Procedure: Procedure(s):   SECTION    Medications prior to admission:   Prior to Admission medications    Medication Sig Start Date End Date Taking? Authorizing Provider   valACYclovir (VALTREX) 500 MG tablet Take 1 tablet by mouth 2 times daily 22   Adilson Francis MD   Prenatal Vit-Fe Fumarate-FA (PRENATAL PO) Take by mouth    Historical Provider, MD   aspirin 81 MG EC tablet Take 81 mg by mouth in the morning. Historical Provider, MD       Current medications:    No current facility-administered medications for this encounter. Allergies: Allergies   Allergen Reactions    Food Swelling     APPLEs cause tongue to swell    Bactrim [Sulfamethoxazole-Trimethoprim] Hives    Pcn [Penicillins] Hives    Penicillin G Hives    Doxycycline Other (See Comments)     Severe gerd       Problem List:    Patient Active Problem List   Diagnosis Code    Genital herpes A60.00    PCOS (polycystic ovarian syndrome) E28.2    Hypogonadotropic hypogonadism syndrome, female ([facility] E23.0    High risk multigravida, third trimester O1.45    History of  delivery, currently pregnant (fetal intolernce of labor s/p 37w IOL for Preeclampsia)- Repeat LTCS scheduled 21 0700 if labor does not take place sooner O34.219    Desires  (vaginal birth after ) trial O31.200    Large for dates affecting management of mother, third trimester, not applicable or unspecified fetus (FH>D @ 32w; 34w US (6# 8oz) 82.3%) O36.63X0    Obesity (BMI 30.0-34. 9) Pregatrvid BMI 31.14 E66.9    Obesity during pregnancy in third trimester O99.213    History of pre-eclampsia in prior pregnancy, currently pregnant, third trimester - started on 81mg ASA O09.293    ASCUS with positive high risk HPV6/ antepartum Pap cervical -repeat  colp PP R87.610, R87.810    History of spontaneous , currently pregnant, third trimester O09.293    Cystic fibrosis carrier, antepartum, third trimester O09.893, Z14.1    Pregnancy headache in third trimester O26.893, R51.9    Decreased fetal movements in third trimester O36.8130    Back pain affecting pregnancy in third trimester - spasm rt ext oblique O99.891, M54.9    Herpes simplex infection in mother during third trimester of pregnancy - Valtrex 500mg BID @ 36 weeks O98.513, B00.9    Genetic testing 46XY, positive carrier CF with LOW RISK fetus. Z13.79       Past Medical History:        Diagnosis Date    Abdominal pain     Abnormal Pap smear of cervix 2018    with Dr Al De Dios Esophageal spasm     Genital herpes 2014    as of 2015 / no active breakout    GERD (gastroesophageal reflux disease)     History of PCOS     Hypogonadotropic hypogonadism syndrome, female ([address] 2014    Exercise induced. Past Surgical History:        Procedure Laterality Date    BLADDER SURGERY       SECTION N/A 2019     SECTION performed by Sulma Antunez MD at [facility] L&D OR    ENDOSCOPY, COLON, DIAGNOSTIC  2015       Social History:    Social History     Tobacco Use    Smoking status: Never    Smokeless tobacco: Never   Substance Use Topics    Alcohol use: No                                Counseling given: Not Answered      Vital Signs (Current): There were no vitals filed for this visit.                                            BP Readings from Last 3 Encounters:   22 120/72   22 130/84   22 128/76       NPO Status:                                                                                 BMI:   Wt Readings from Last 3 Encounters:   22 267 lb 12.8 oz (121.5 kg)   22 266 lb 6.4 oz (120.8 kg)   22 265 lb (120.2 kg)     There is no height or weight on file to calculate BMI.    CBC:   Lab Results   Component Value Date/Time    WBC 12.6 12/14/2022 09:00 AM    RBC 3.99 12/14/2022 09:00 AM    RBC 4.28 03/09/2020 04:21 PM    HGB 11.6 12/14/2022 09:00 AM    HCT 36.3 12/14/2022 09:00 AM    MCV 91.0 12/14/2022 09:00 AM    RDW 12.8 12/14/2022 09:00 AM     12/14/2022 09:00 AM       CMP:   Lab Results   Component Value Date/Time     12/14/2022 09:00 AM    K 4.2 12/14/2022 09:00 AM     12/14/2022 09:00 AM    CO2 20 12/14/2022 09:00 AM    BUN 9 12/14/2022 09:00 AM    CREATININE 0.7 12/14/2022 09:00 AM    GFRAA >60 09/22/2022 11:10 AM    LABGLOM >60 12/14/2022 09:00 AM    GLUCOSE 108 12/14/2022 09:00 AM    PROT 7.0 12/14/2022 09:00 AM    CALCIUM 9.2 12/14/2022 09:00 AM    BILITOT 0.2 12/14/2022 09:00 AM    ALKPHOS 156 12/14/2022 09:00 AM    AST 16 12/14/2022 09:00 AM    ALT 11 12/14/2022 09:00 AM       POC Tests: No results for input(s): POCGLU, POCNA, POCK, POCCL, POCBUN, POCHEMO, POCHCT in the last 72 hours. Coags:   Lab Results   Component Value Date/Time    APTT 34.0 04/02/2021 02:44 PM       HCG (If Applicable):   Lab Results   Component Value Date    PREGTESTUR negative 05/02/2016        ABGs: No results found for: PHART, PO2ART, MQJ1TRC, ORW8CFN, BEART, [id number]     Type & Screen (If Applicable):  No results found for: LABABO, LABRH    Drug/Infectious Status (If Applicable):  No results found for: HIV, HEPCAB    COVID-19 Screening (If Applicable):   Lab Results   Component Value Date/Time    COVID19 Not Detected.  12/16/2020 01:52 PM           Anesthesia Evaluation  Patient summary reviewed and Nursing notes reviewed no history of anesthetic complications:   Airway: Mallampati: II  TM distance: >3 FB   Neck ROM: full  Mouth opening: > = 3 FB   Dental: normal exam         Pulmonary:Negative Pulmonary ROS and normal exam  breath sounds clear to auscultation      (-) not a current smoker          Patient did not smoke on day of surgery. Cardiovascular:Negative CV ROS            Rhythm: regular  Rate: normal                    Neuro/Psych:   (+) headaches: migraine headaches,             GI/Hepatic/Renal:   (+) GERD: well controlled,           Endo/Other: Negative Endo/Other ROS             Pt had no PAT visit       Abdominal:             Vascular: negative vascular ROS. Other Findings:           Anesthesia Plan      general and spinal     ASA 2     (Discussed risks and benefits of spinal anesthesia, discussed general anesthesia if needed.)      MIPS: Postoperative opioids intended and Prophylactic antiemetics administered. Anesthetic plan and risks discussed with patient. Use of blood products discussed with patient whom consented to blood products. Plan discussed with attending.                     Sai Quispe, JULIANO - CRNA   12/30/2022

## 2022-12-30 NOTE — FLOWSHEET NOTE
Patient to mom baby ,oriented to unit and room ,call light with in reach ,[place] draining clear yellow .

## 2022-12-30 NOTE — FLOWSHEET NOTE
iV saline locked ,tolerating fluids,neida ROSS'D DTV [phone],ambulated to bathroom with assist ,pericare done ,instructed to call for assist

## 2022-12-30 NOTE — ANESTHESIA PROCEDURE NOTES
Spinal Block    Patient location during procedure: OB  Reason for block: procedure for pain, post-op pain management, primary anesthetic and at surgeon's request  Staffing  Performed: resident/CRNA   Resident/CRNA: JULIANO Andrews CRNA  Spinal Block  Patient position: sitting  Prep: ChloraPrep  Patient monitoring: cardiac monitor, continuous pulse ox and frequent blood pressure checks  Approach: midline  Location: L3/L4  Provider prep: mask and sterile gloves  Local infiltration: lidocaine  Needle  Needle type: Pencan   Needle gauge: 25 G  Needle length: 3.5 in  Assessment  Sensory level: T4  Events: cerebrospinal fluid  Swirl obtained: Yes  CSF: clear  Attempts: 1  Hemodynamics: stable  Preanesthetic Checklist  Completed: patient identified, IV checked, site marked, risks and benefits discussed, surgical/procedural consents, equipment checked, pre-op evaluation, timeout performed, anesthesia consent given, oxygen available and monitors applied/VS acknowledged

## 2022-12-30 NOTE — OP NOTE
Operative Note      Patient: Brianda Horton  YOB: 1990  MRN: [id number]    Date of Procedure: 2022    Pre-Op Diagnosis: High risk multigravid IUP at 39 weeks 5 days, previous  section -declines , large for dates, obesity (pregravid BMI 31.14), history of preeclampsia in prior pregnancy, history of spontaneous , cystic fibrosis carrier (low risk to fetus), history of HSV-2, history of PCOS  Patient Active Problem List   Diagnosis    Genital herpes    PCOS (polycystic ovarian syndrome)    High risk multigravida, third trimester    History of  delivery, currently pregnant (fetal intolernce of labor s/p 37w IOL for Preeclampsia)- Repeat LTCS scheduled 21 0700 if labor does not take place sooner    Declines  (vaginal birth after ) trial    Large for dates affecting management of mother, third trimester, not applicable or unspecified fetus (FH>D @ 32w; 34w US (6# 8oz) 82.3%)    Obesity (BMI 30.0-34. 9) Pregatrvid BMI 31.14    Obesity during pregnancy in third trimester    History of pre-eclampsia in prior pregnancy, currently pregnant, third trimester - started on 81mg ASA    ASCUS with positive high risk HPV6/29/22 antepartum Pap cervical -repeat  colp PP    History of spontaneous , currently pregnant, third trimester    Cystic fibrosis carrier, antepartum, third trimester    Herpes simplex infection in mother during third trimester of pregnancy - Valtrex 500mg BID @ 36 weeks    Genetic testing 46XY, positive carrier CF with LOW RISK fetus.     44 5/7 weeks gestation of pregnancy     Post-Op Diagnosis: Same, macrosomic male infant delivered     Patient Active Problem List   Diagnosis    Genital herpes    PCOS (polycystic ovarian syndrome)    High risk multigravida, third trimester    History of  delivery, currently pregnant (fetal intolernce of labor s/p 37w IOL for Preeclampsia)- Repeat LTCS scheduled 21 0700 if labor does not take place sooner    Declines  (vaginal birth after ) trial    Large for dates affecting management of mother, third trimester, not applicable or unspecified fetus (FH>D @ 32w; 34w US (6# 8oz) 82.3%)    Obesity (BMI 30.0-34. 9) Pregatrvid BMI 31.14    Obesity during pregnancy in third trimester    History of pre-eclampsia in prior pregnancy, currently pregnant, third trimester - started on 81mg ASA    ASCUS with positive high risk HPV6/29/22 antepartum Pap cervical -repeat  colp PP    History of spontaneous , currently pregnant, third trimester    Cystic fibrosis carrier, antepartum, third trimester    Herpes simplex infection in mother during third trimester of pregnancy - Valtrex 500mg BID @ 36 weeks    Genetic testing 46XY, positive carrier CF with LOW RISK fetus. 39 5/7 weeks gestation of pregnancy     delivery, delivered, current hospitalization    Term birth of  male     macrosomia (4200 g, 10 pounds 2 ounces)         Procedure(s): Repeat Low Transverse  Section Via Pfannenstiel Skin Incision    Surgeon(s): Arnaud Skinner MD    Assistant: Dasia Car DO    Anesthesia: Spinal with Duramorph    Complications: None    Estimated Blood Loss (mL): 500    Fluids Place (mL): 2000, crystalloid    Urine Output (mL): 300, clear      Drains: Minor to gravity    Intraoperative Medications: Clindamycin 900 mg and gentamicin 500 mg IV on-call to the OR, IV pitocin drip post placenta, 30 mg IV Toradol at conclusion of case    Indication: 29-year-old W female  3 para 2-0-1-2 at 39 weeks 5 days who presents for a repeat low-transverse  section. Patient was initially considering a TOLAC if she went into spontaneous labor prior to her scheduled  section date. As the due date approached she was leaning more towards a repeat  section due to suspected fetal macrosomia, unfavorable cervix, a floating vertex and she did not want to be induced. Pregnancy was complicated by HSV on Valtrex, history of preeclampsia in prior pregnancy, ASCUS on her Pap smear, cystic fibrosis carrier status and a history of spontaneous . On presenting to labor and delivery she denies contractions, leaking fluid and vaginal bleeding. She has no symptoms of UTI or PIH. There is good fetal movement. Management options were reviewed, the risks of surgery were discussed, including, but not limited to that of anesthesia, infection, hemorrhage, transfusion, blood borne disease, bowel/bladder/ureteral/vascular injury, and any corrective surgeries (bladder, ureter, bowel, hysterectomy), uterine or cervical lacerations, injury to the baby, DVT,PE, pain and death. Questions were answered to both the patient's and FOB's satisfaction and informed consent was obtained to proceed as planned with a repeat low-transverse  delivery. Findings:  Live Born 2022 at 9:38 AM  Sex: Male  Fetal Position:  Cephalic, occiput anterior  Apgars:  1 minute:  8; 5 minute:  9  Weight:  10# 2oz, 4600 grams  Nuchal Cord: was not present  Placenta: was delivered with gentle traction and was noted to be intact, whole, and that the umbilical cord had three vessels noted. Tubes, uterus, ovaries:  Normal    Specimens:   * No specimens in log *    Implants:  * No implants in log *      Condition:    Infant stable, transfer to post anesthesia recovery [address]  Mother stable, transfer to post anesthesia recovery then to Maternity    Detailed Description of Procedure: The patient was taken to the operating room, where patient identification was confirmed and a Time Out was performed. Duramorph spinal anesthesia was found to be adequate. She was prepped with a DuraPrep solution and draped in the usual sterile fashion in the dorsal supine position with leftward tilt.   A Pfannenstiel skin incision was then made with a scalpel through the previous  section scar and carried through to the underlying lay of fascia with the Bovie. The fascia was nicked in the midline and incision was extended laterally with the Thomason scissors. Superior aspect of the fascial incision was grasped with Kocher clamps and elevated. The underlying rectus muscle was dissected using sharp and blunt dissection. Attention was then turned to the inferior aspect of this incision, which in a similar fashion was grasped with Kocher clamps and elevated. The underlying rectus muscle was dissected off using sharp and blunt dissection. The rectus muscles were then  in the midline, peritoneum identified, tented up, and entered with the Metzenbaum scissors. The peritoneal incision was extended superiorly and inferiorly with good visualization of the bladder. The bladder blade was then inserted and the vesicouterine peritoneum identified, grasped with pickups, entered sharply with Metzenbaum scissors. This incision was extended laterally and the bladder flap created digitally. The bladder blade was then reinserted and the lower uterine segment incised in a transverse fasion with the scalpel. The uterine incision was then extended laterally with blunt digital dissection. The bladder blade was then removed and the infant's head delivered atraumatically with the appropriate amount of fundal pressure. The nose and mouth were suctioned with bulb suction and the remaining infant was delivered. The cord was clamped and cut after a 30 second delay. The infant was handed off to the waiting nurses. The placenta was then removed with gentle cord traction and the uterus exteriorized and cleared of all clot and debris. The uterine incision was then repaired with 1 Chromic in a running, locked fashion. A second layer of the same suture was used to obtain excellent hemostasis. The bladder flap was repaired with 3-0 Vicryl in a running stitch and the uterus was returned to the abdomen.   The gutters were then cleared of all clot and debris. The anterior abdominal wall peritoneum was closed with a running stitch of 3-0 Vicryl. The fascia was reapproximated with 1 Stratafix in a running fashion. The subcutaneous tissue was reapproximated in two layers, using a running stitch of 3-0 plain suture. The skin was closed with a running subcuticular stitch of 4-0 Stratafix. Dermabond skin glue was applied as a sterile dressing. The patient tolerated the procedure well. Sponge, lap, needle, and instruments were correct x2.  500 mg of gentamicin and then 8 mg of clindamycin were given intravenously on call to the OR, followed by Pitocin drip after delivery of the placenta. The patient was taken to the Recovery Room in awake, stable, postoperative state.     Electronically signed by Thelma Emmanuel MD on 12/30/2022 at 11:45 AM

## 2022-12-30 NOTE — PROGRESS NOTES
Viable baby boy born at [address] [place] [address] via [address] [place] [address]. APGARS 8/9. Bonding well with baby skin to skin.

## 2022-12-30 NOTE — H&P
CHIEF COMPLAINT:  here for repeat c/s, denies lof vb or ctx. Baby moving well. HISTORY OF PRESENT ILLNESS:      The patient is a 29 y.o. female at 38w11d. OB History          3    Para   1    Term   1       0    AB   1    Living   1         SAB   0    IAB   0    Ectopic   0    Molar   0    Multiple   0    Live Births   1            Patient presents with a chief complaint as above and is being admitted for   without tubal ligation    Estimated Due Date: Estimated Date of Delivery: 23    PRENATAL CARE:    Complicated by: previous c/s, HSV on valtrex, LGA, history Pre-eclampsia, ascus pap,CF carrier, history miscarriage    PAST OB HISTORY  OB History          3    Para   1    Term   1       0    AB   1    Living   1         SAB   0    IAB   0    Ectopic   0    Molar   0    Multiple   0    Live Births   1                Past Medical History:        Diagnosis Date    Abdominal pain     Abnormal Pap smear of cervix 2018    with Dr Mirta Foster     Esophageal spasm     Genital herpes 2014    as of 2015 / no active breakout    GERD (gastroesophageal reflux disease)     History of PCOS     Hypogonadotropic hypogonadism syndrome, female ([facility] [address] 2014    Exercise induced.      Past Surgical History:        Procedure Laterality Date    BLADDER SURGERY       SECTION N/A 2019     SECTION performed by Danish Heaton MD at [facility] L[facility] OR    ENDOSCOPY, COLON, DIAGNOSTIC  2015     Allergies:  Food, Bactrim [sulfamethoxazole-trimethoprim], Pcn [penicillins], Penicillin g, and Doxycycline  Social History:    Social History     Socioeconomic History    Marital status:      Spouse name: Not on file    Number of children: Not on file    Years of education: Not on file    Highest education level: Not on file   Occupational History    Not on file   Tobacco Use    Smoking status: Never    Smokeless tobacco: Never   Vaping Use    Vaping Use: Never used   Substance and Sexual Activity    Alcohol use: No    Drug use: No    Sexual activity: Yes     Partners: Male   Other Topics Concern    Not on file   Social History Narrative    ** Merged History Encounter **          Social Determinants of Health     Financial Resource Strain: Not on file   Food Insecurity: Not on file   Transportation Needs: Not on file   Physical Activity: Not on file   Stress: Not on file   Social Connections: Not on file   Intimate Partner Violence: Not on file   Housing Stability: Not on file     Family History:       Problem Relation Age of Onset    No Known Problems Mother     Prostate Cancer Father     High Cholesterol Father      Medications Prior to Admission:  Medications Prior to Admission: valACYclovir (VALTREX) 500 MG tablet, Take 1 tablet by mouth 2 times daily  Prenatal Vit-Fe Fumarate-FA (PRENATAL PO), Take by mouth  aspirin 81 MG EC tablet, Take 81 mg by mouth in the morning. REVIEW OF SYSTEMS:    CONSTITUTIONAL:  negative  HEENT: negative  BREAST: negative  RESPIRATORY:  negative  CARDIOVASCULAR:  negative  GASTROINTESTINAL:  negative  : negative  ALLERGIC/IMMUNOLOGIC:  negative  NEUROLOGICAL:  negative  ENDOCRINE: negative  BEHAVIOR/PSYCH:  negative    PHYSICAL EXAM:  There were no vitals filed for this visit. General appearance:  awake, alert, cooperative, no apparent distress, and appears stated age  Skin: warm and dry. No rash observed  Breast: Warm, no redness or masses felt. No nipple discharge  Neurologic:  Awake, alert, oriented to name, place and time. Lungs:  No increased work of breathing, good air exchange  Abdomen:  Soft, non tender, gravid, consistent with her gestational age, EFW by Leopald's manouever was aga   Fetal heart rate:  Reassuring.   Pelvis:  Adequate pelvis  Cervix:    deferred  Contraction frequency:  rare    Membranes:  Intact    ASSESSMENT AND PLAN:    Repeat C/S orders per Dr Chandler Linares

## 2022-12-31 PROBLEM — D62 POSTOPERATIVE ANEMIA DUE TO ACUTE BLOOD LOSS: Status: ACTIVE | Noted: 2022-12-31

## 2022-12-31 LAB
BASOPHILS ABSOLUTE: 0.04 E9/L (ref 0–0.2)
BASOPHILS RELATIVE PERCENT: 0.3 % (ref 0–2)
EOSINOPHILS ABSOLUTE: 0.22 E9/L (ref 0.05–0.5)
EOSINOPHILS RELATIVE PERCENT: 1.9 % (ref 0–6)
HCT VFR BLD CALC: 31.4 % (ref 34–48)
HEMOGLOBIN: 10.4 G/DL (ref 11.5–15.5)
IMMATURE GRANULOCYTES #: 0.13 E9/L
IMMATURE GRANULOCYTES %: 1.1 % (ref 0–5)
LYMPHOCYTES ABSOLUTE: 2.21 E9/L (ref 1.5–4)
LYMPHOCYTES RELATIVE PERCENT: 18.9 % (ref 20–42)
MCH RBC QN AUTO: 29.9 PG (ref 26–35)
MCHC RBC AUTO-ENTMCNC: 33.1 % (ref 32–34.5)
MCV RBC AUTO: 90.2 FL (ref 80–99.9)
MONOCYTES ABSOLUTE: 1.24 E9/L (ref 0.1–0.95)
MONOCYTES RELATIVE PERCENT: 10.6 % (ref 2–12)
NEUTROPHILS ABSOLUTE: 7.88 E9/L (ref 1.8–7.3)
NEUTROPHILS RELATIVE PERCENT: 67.2 % (ref 43–80)
PDW BLD-RTO: 13.1 FL (ref 11.5–15)
PLATELET # BLD: 196 E9/L (ref 130–450)
PMV BLD AUTO: 9.6 FL (ref 7–12)
RBC # BLD: 3.48 E12/L (ref 3.5–5.5)
WBC # BLD: 11.7 E9/L (ref 4.5–11.5)

## 2022-12-31 PROCEDURE — 2500000003 HC RX 250 WO HCPCS: Performed by: OBSTETRICS & GYNECOLOGY

## 2022-12-31 PROCEDURE — 85025 COMPLETE CBC W/AUTO DIFF WBC: CPT

## 2022-12-31 PROCEDURE — 6370000000 HC RX 637 (ALT 250 FOR IP): Performed by: OBSTETRICS & GYNECOLOGY

## 2022-12-31 PROCEDURE — 6360000002 HC RX W HCPCS: Performed by: OBSTETRICS & GYNECOLOGY

## 2022-12-31 PROCEDURE — 1220000000 HC SEMI PRIVATE OB R&B

## 2022-12-31 PROCEDURE — 2580000003 HC RX 258: Performed by: OBSTETRICS & GYNECOLOGY

## 2022-12-31 PROCEDURE — 36415 COLL VENOUS BLD VENIPUNCTURE: CPT

## 2022-12-31 RX ADMIN — DOCUSATE SODIUM 100 MG: 100 CAPSULE, LIQUID FILLED ORAL at 20:16

## 2022-12-31 RX ADMIN — SODIUM CHLORIDE, PRESERVATIVE FREE 20 MG: 5 INJECTION INTRAVENOUS at 09:11

## 2022-12-31 RX ADMIN — IBUPROFEN 600 MG: 200 TABLET, FILM COATED ORAL at 22:19

## 2022-12-31 RX ADMIN — OXYCODONE HYDROCHLORIDE 5 MG: 5 TABLET ORAL at 04:21

## 2022-12-31 RX ADMIN — ASPIRIN 81 MG: 81 TABLET, COATED ORAL at 09:10

## 2022-12-31 RX ADMIN — OXYCODONE HYDROCHLORIDE 10 MG: 5 TABLET ORAL at 13:13

## 2022-12-31 RX ADMIN — OXYCODONE HYDROCHLORIDE 10 MG: 5 TABLET ORAL at 20:16

## 2022-12-31 RX ADMIN — SIMETHICONE 80 MG: 80 TABLET, CHEWABLE ORAL at 09:11

## 2022-12-31 RX ADMIN — METFORMIN HYDROCHLORIDE 1 TABLET: 500 TABLET, EXTENDED RELEASE ORAL at 09:10

## 2022-12-31 RX ADMIN — KETOROLAC TROMETHAMINE 30 MG: 30 INJECTION, SOLUTION INTRAMUSCULAR; INTRAVENOUS at 09:11

## 2022-12-31 RX ADMIN — IBUPROFEN 600 MG: 200 TABLET, FILM COATED ORAL at 16:05

## 2022-12-31 RX ADMIN — DOCUSATE SODIUM 100 MG: 100 CAPSULE, LIQUID FILLED ORAL at 09:11

## 2022-12-31 ASSESSMENT — PAIN SCALES - GENERAL
PAINLEVEL_OUTOF10: 7
PAINLEVEL_OUTOF10: 7
PAINLEVEL_OUTOF10: 5
PAINLEVEL_OUTOF10: 7
PAINLEVEL_OUTOF10: 7

## 2022-12-31 ASSESSMENT — PAIN DESCRIPTION - LOCATION
LOCATION: INCISION
LOCATION: ABDOMEN;INCISION

## 2022-12-31 ASSESSMENT — PAIN DESCRIPTION - DESCRIPTORS
DESCRIPTORS: TENDER;SORE
DESCRIPTORS: SORE;TENDER

## 2022-12-31 ASSESSMENT — PAIN DESCRIPTION - ORIENTATION: ORIENTATION: LOWER

## 2022-12-31 NOTE — ANESTHESIA POSTPROCEDURE EVALUATION
Department of Anesthesiology  Postprocedure Note    Patient: Eugene Alcala  MRN: [id number]  YOB: 1990  Date of evaluation: 2022      Procedure Summary     Date: 22 Room / Location: [facility] OR 01 / [facility]    Anesthesia Start:  Anesthesia Stop: 6023    Procedure:  SECTION (Uterus) Diagnosis:       Previous  section      (Previous  section [V24.617])    Surgeons: Christopher Stallings MD Responsible Provider: Cj Romeo MD    Anesthesia Type: general, spinal ASA Status: 2          Anesthesia Type: No value filed.     Salina Phase I: Salina Score: 10    Salina Phase II:        Anesthesia Post Evaluation    Patient location during evaluation: floor  Patient participation: complete - patient participated  Level of consciousness: awake and alert  Airway patency: patent  Nausea & Vomiting: no nausea and no vomiting  Complications: no  Cardiovascular status: blood pressure returned to baseline  Respiratory status: acceptable and room air  Hydration status: euvolemic

## 2022-12-31 NOTE — LACTATION NOTE
Mom reports baby is feeding well, cluster feeding. Nipples are sore, reviewed ways to deepen the latch Encouraged skin to skin and frequent attempts at breast to stimulate milk production. Instructed on normal infant behavior in the first 12-24 hours and importance of stimulating the baby frequently to eat during this time. Reviewed hand expression, and encouraged to hand express drops of colostrum when baby is sleepy. Instructed that baby may also feed 8-12 times a day- cluster feeding at times- as her milk supply is being established. Instructed on benefits of skin to skin and avoidance of pacifier / artificial nipple use until breastfeeding is well established. Educated on making sure infant has an open airway while breastfeeding and skin to skin. Instructed on hunger cues and waking techniques to try. Reviewed signs of adequate I & O; allow baby to feed ad pk and not to limit time at breast. Breastfeeding booklet provided with review of its contents. Encouraged to call with any concerns. Mom requests an electric breast pump for home to increase milk supply.

## 2022-12-31 NOTE — PROGRESS NOTES
POD #1    Patient:  Mario Mcknight Date:  2022  6:52 AM  Medical Record Number:  [id number]   Today's Date: 2022    S: No complaints, doing well; tolerating diet: yes -general; ambulating well: yes -up in room and in halls; voiding without difficulty:  yes -has no urinary complaints; bm: denies; flatus: yes -normal; pain meds appropriate: yes -Roxicodone and Tylenol the first 24 hours to supplement her Duramorph spinal, converting to ibuprofen 600 mg, Roxicodone and Tylenol now that Duramorph orders have ; vaginal bleeding: Less than a period.     O:   Vitals:    22 2047 22 2317 22 0525 22 0700   BP: (!) 114/59 120/65 118/71 109/64   Pulse: 89 85 (!) 101 83   Resp: 16 16 16 16   Temp: 98.2 °F (36.8 °C) 98 °F (36.7 °C) 97.9 °F (36.6 °C) 97.9 °F (36.6 °C)   TempSrc: Oral Oral Oral Oral   SpO2: 98% 98% 98% 99%     Gen: A&O, cooperative, pleasant   Heart: RRR   Lungs: CTA b/l   Abd; soft, NT, non distended, +BS  Back: no CVA or paraspinal tenderness   Ext: No clubbing, cyanosis, edema:1+ pitting, no cords palpable, no calf tenderness   Neuro: intact   Inc: clean, dry, intact with Dermabond  Uterus: firm, well contracted, nt   Heather pad: staining only, thin lochia    Recent Results (from the past 72 hour(s))   URINE DRUG SCREEN    Collection Time: 22  8:05 AM   Result Value Ref Range    Amphetamine Screen, Urine NOT DETECTED Negative <1000 ng/mL    Barbiturate Screen, Ur NOT DETECTED Negative < 200 ng/mL    Benzodiazepine Screen, Urine NOT DETECTED Negative < 200 ng/mL    Cannabinoid Scrn, Ur NOT DETECTED Negative < 50ng/mL    Cocaine Metabolite Screen, Urine NOT DETECTED Negative < 300 ng/mL    Opiate Scrn, Ur NOT DETECTED Negative < 300ng/mL    PCP Screen, Urine NOT DETECTED Negative < 25 ng/mL    Methadone Screen, Urine NOT DETECTED Negative <300 ng/mL    Oxycodone Urine NOT DETECTED Negative <100 ng/mL    FENTANYL SCREEN, URINE NOT DETECTED Negative <1 ng/mL Drug Screen Comment: see below    TYPE AND SCREEN    Collection Time: 22  8:05 AM   Result Value Ref Range    ABO/Rh O POS     Antibody Screen NEG    CBC    Collection Time: 22  8:05 AM   Result Value Ref Range    WBC 11.8 (H) 4.5 - 11.5 E9/L    RBC 4.06 3.50 - 5.50 E12/L    Hemoglobin 12.1 11.5 - 15.5 g/dL    Hematocrit 35.9 34.0 - 48.0 %    MCV 88.4 80.0 - 99.9 fL    MCH 29.8 26.0 - 35.0 pg    MCHC 33.7 32.0 - 34.5 %    RDW 13.1 11.5 - 15.0 fL    Platelets 874 908 - 203 E9/L    MPV 9.4 7.0 - 12.0 fL   CBC auto differential    Collection Time: 22  5:20 AM   Result Value Ref Range    WBC 11.7 (H) 4.5 - 11.5 E9/L    RBC 3.48 (L) 3.50 - 5.50 E12/L    Hemoglobin 10.4 (L) 11.5 - 15.5 g/dL    Hematocrit 31.4 (L) 34.0 - 48.0 %    MCV 90.2 80.0 - 99.9 fL    MCH 29.9 26.0 - 35.0 pg    MCHC 33.1 32.0 - 34.5 %    RDW 13.1 11.5 - 15.0 fL    Platelets 859 023 - 956 E9/L    MPV 9.6 7.0 - 12.0 fL    Neutrophils % 67.2 43.0 - 80.0 %    Immature Granulocytes % 1.1 0.0 - 5.0 %    Lymphocytes % 18.9 (L) 20.0 - 42.0 %    Monocytes % 10.6 2.0 - 12.0 %    Eosinophils % 1.9 0.0 - 6.0 %    Basophils % 0.3 0.0 - 2.0 %    Neutrophils Absolute 7.88 (H) 1.80 - 7.30 E9/L    Immature Granulocytes # 0.13 E9/L    Lymphocytes Absolute 2.21 1.50 - 4.00 E9/L    Monocytes Absolute 1.24 (H) 0.10 - 0.95 E9/L    Eosinophils Absolute 0.22 0.05 - 0.50 E9/L    Basophils Absolute 0.04 0.00 - 0.20 E9/L       A: 29 y.o. female [id number] at 38w11d  POD#1 S/P repeat low transverse  section  Postoperative anemia due to acute blood loss  Patient Active Problem List   Diagnosis    Genital herpes    PCOS (polycystic ovarian syndrome)    High risk multigravida, third trimester    History of  delivery, currently pregnant (fetal intolernce of labor s/p 37w IOL for Preeclampsia)- Repeat LTCS scheduled 21 0700 if labor does not take place sooner    Declines  (vaginal birth after ) trial    Large for dates affecting management of mother, third trimester, not applicable or unspecified fetus (FH>D @ 32w; 34w US (6# 8oz) 82.3%)    Obesity (BMI 30.0-34. 9) Pregatrvid BMI 31.14    Obesity during pregnancy in third trimester    History of pre-eclampsia in prior pregnancy, currently pregnant, third trimester - started on 81mg ASA    ASCUS with positive high risk HPV6/29/22 antepartum Pap cervical -repeat  colp PP    History of spontaneous , currently pregnant, third trimester    Cystic fibrosis carrier, antepartum, third trimester    Herpes simplex infection in mother during third trimester of pregnancy - Valtrex 500mg BID @ 36 weeks    Genetic testing 46XY, positive carrier CF with LOW RISK fetus. 44 5/7 weeks gestation of pregnancy     delivery, delivered, current hospitalization    Term birth of  male     macrosomia (4200 g, 10 pounds 2 ounces)    Postoperative anemia due to acute blood loss       P: Routine post-op care. D/C IV, IVF's, IV meds and Minor. Advance diet and activity as tolerated. Initiate PO pain meds.     Daquan Aparicio MD FACOG  2022 4:26 PM

## 2023-01-01 PROCEDURE — 6370000000 HC RX 637 (ALT 250 FOR IP): Performed by: OBSTETRICS & GYNECOLOGY

## 2023-01-01 PROCEDURE — 1220000000 HC SEMI PRIVATE OB R&B

## 2023-01-01 RX ADMIN — OXYCODONE HYDROCHLORIDE 10 MG: 5 TABLET ORAL at 20:16

## 2023-01-01 RX ADMIN — OXYCODONE HYDROCHLORIDE 10 MG: 5 TABLET ORAL at 00:59

## 2023-01-01 RX ADMIN — DOCUSATE SODIUM 100 MG: 100 CAPSULE, LIQUID FILLED ORAL at 08:13

## 2023-01-01 RX ADMIN — OXYCODONE HYDROCHLORIDE 10 MG: 5 TABLET ORAL at 11:12

## 2023-01-01 RX ADMIN — ASPIRIN 81 MG: 81 TABLET, COATED ORAL at 08:13

## 2023-01-01 RX ADMIN — IBUPROFEN 600 MG: 200 TABLET, FILM COATED ORAL at 18:21

## 2023-01-01 RX ADMIN — OXYCODONE HYDROCHLORIDE 10 MG: 5 TABLET ORAL at 06:39

## 2023-01-01 RX ADMIN — IBUPROFEN 600 MG: 200 TABLET, FILM COATED ORAL at 04:43

## 2023-01-01 RX ADMIN — SIMETHICONE 80 MG: 80 TABLET, CHEWABLE ORAL at 08:13

## 2023-01-01 RX ADMIN — METFORMIN HYDROCHLORIDE 1 TABLET: 500 TABLET, EXTENDED RELEASE ORAL at 08:13

## 2023-01-01 RX ADMIN — OXYCODONE HYDROCHLORIDE 10 MG: 5 TABLET ORAL at 15:14

## 2023-01-01 RX ADMIN — DOCUSATE SODIUM 100 MG: 100 CAPSULE, LIQUID FILLED ORAL at 20:16

## 2023-01-01 ASSESSMENT — PAIN SCALES - GENERAL
PAINLEVEL_OUTOF10: 7
PAINLEVEL_OUTOF10: 6
PAINLEVEL_OUTOF10: 7

## 2023-01-01 ASSESSMENT — PAIN - FUNCTIONAL ASSESSMENT
PAIN_FUNCTIONAL_ASSESSMENT: ACTIVITIES ARE NOT PREVENTED

## 2023-01-01 ASSESSMENT — PAIN DESCRIPTION - DESCRIPTORS
DESCRIPTORS: SORE
DESCRIPTORS: ACHING;SORE
DESCRIPTORS: ACHING;SORE

## 2023-01-01 ASSESSMENT — PAIN DESCRIPTION - LOCATION
LOCATION: INCISION

## 2023-01-02 VITALS
SYSTOLIC BLOOD PRESSURE: 122 MMHG | OXYGEN SATURATION: 100 % | HEART RATE: 91 BPM | DIASTOLIC BLOOD PRESSURE: 72 MMHG | TEMPERATURE: 98.7 F | RESPIRATION RATE: 18 BRPM

## 2023-01-02 PROBLEM — O36.63X0 LARGE FOR DATES AFFECTING MANAGEMENT OF MOTHER, THIRD TRIMESTER, NOT APPLICABLE OR UNSPECIFIED FETUS: Status: RESOLVED | Noted: 2022-12-02 | Resolved: 2023-01-02

## 2023-01-02 PROBLEM — O99.213 OBESITY DURING PREGNANCY IN THIRD TRIMESTER: Status: RESOLVED | Noted: 2022-12-02 | Resolved: 2023-01-02

## 2023-01-02 PROBLEM — O34.219 HISTORY OF CESAREAN DELIVERY, CURRENTLY PREGNANT: Status: RESOLVED | Noted: 2022-12-02 | Resolved: 2023-01-02

## 2023-01-02 PROBLEM — O98.513 HERPES SIMPLEX INFECTION IN MOTHER DURING THIRD TRIMESTER OF PREGNANCY: Status: RESOLVED | Noted: 2022-12-22 | Resolved: 2023-01-02

## 2023-01-02 PROBLEM — O09.893 CYSTIC FIBROSIS CARRIER, ANTEPARTUM, THIRD TRIMESTER: Status: RESOLVED | Noted: 2022-12-02 | Resolved: 2023-01-02

## 2023-01-02 PROBLEM — Z3A.39 39 WEEKS GESTATION OF PREGNANCY: Status: RESOLVED | Noted: 2022-12-30 | Resolved: 2023-01-02

## 2023-01-02 PROBLEM — O09.293 HISTORY OF SPONTANEOUS ABORTION, CURRENTLY PREGNANT, THIRD TRIMESTER: Status: RESOLVED | Noted: 2022-12-02 | Resolved: 2023-01-02

## 2023-01-02 PROBLEM — O09.293 HISTORY OF PRE-ECLAMPSIA IN PRIOR PREGNANCY, CURRENTLY PREGNANT, THIRD TRIMESTER: Status: RESOLVED | Noted: 2022-12-02 | Resolved: 2023-01-02

## 2023-01-02 PROBLEM — B00.9 HERPES SIMPLEX INFECTION IN MOTHER DURING THIRD TRIMESTER OF PREGNANCY: Status: RESOLVED | Noted: 2022-12-22 | Resolved: 2023-01-02

## 2023-01-02 PROBLEM — O34.219 DECLINES VBAC (VAGINAL BIRTH AFTER CESAREAN) TRIAL: Status: RESOLVED | Noted: 2022-12-02 | Resolved: 2023-01-02

## 2023-01-02 PROBLEM — Z14.1 CYSTIC FIBROSIS CARRIER, ANTEPARTUM, THIRD TRIMESTER: Status: RESOLVED | Noted: 2022-12-02 | Resolved: 2023-01-02

## 2023-01-02 PROBLEM — Z13.79 GENETIC TESTING: Status: RESOLVED | Noted: 2022-12-28 | Resolved: 2023-01-02

## 2023-01-02 PROBLEM — O09.43 HIGH RISK MULTIGRAVIDA, THIRD TRIMESTER: Status: RESOLVED | Noted: 2022-12-02 | Resolved: 2023-01-02

## 2023-01-02 PROCEDURE — 6370000000 HC RX 637 (ALT 250 FOR IP): Performed by: OBSTETRICS & GYNECOLOGY

## 2023-01-02 RX ORDER — PSEUDOEPHEDRINE HCL 30 MG
100 TABLET ORAL 2 TIMES DAILY PRN
COMMUNITY
Start: 2023-01-02

## 2023-01-02 RX ORDER — ACETAMINOPHEN 500 MG
1000 TABLET ORAL EVERY 6 HOURS PRN
Refills: 0 | COMMUNITY
Start: 2023-01-02

## 2023-01-02 RX ORDER — IBUPROFEN 600 MG/1
600 TABLET ORAL EVERY 6 HOURS PRN
Qty: 60 TABLET | Refills: 0 | COMMUNITY
Start: 2023-01-02

## 2023-01-02 RX ORDER — SIMETHICONE 80 MG
80 TABLET,CHEWABLE ORAL EVERY 6 HOURS PRN
Refills: 0 | COMMUNITY
Start: 2023-01-02

## 2023-01-02 RX ORDER — MODIFIED LANOLIN
1 OINTMENT (GRAM) TOPICAL
COMMUNITY
Start: 2023-01-02

## 2023-01-02 RX ORDER — OXYCODONE HYDROCHLORIDE 5 MG/1
5 TABLET ORAL EVERY 6 HOURS PRN
Qty: 20 TABLET | Refills: 0 | Status: SHIPPED | OUTPATIENT
Start: 2023-01-02 | End: 2023-01-07

## 2023-01-02 RX ADMIN — ASPIRIN 81 MG: 81 TABLET, COATED ORAL at 08:12

## 2023-01-02 RX ADMIN — OXYCODONE HYDROCHLORIDE 10 MG: 5 TABLET ORAL at 01:58

## 2023-01-02 RX ADMIN — IBUPROFEN 600 MG: 200 TABLET, FILM COATED ORAL at 01:59

## 2023-01-02 RX ADMIN — DOCUSATE SODIUM 100 MG: 100 CAPSULE, LIQUID FILLED ORAL at 08:12

## 2023-01-02 RX ADMIN — OXYCODONE HYDROCHLORIDE 10 MG: 5 TABLET ORAL at 06:39

## 2023-01-02 RX ADMIN — METFORMIN HYDROCHLORIDE 1 TABLET: 500 TABLET, EXTENDED RELEASE ORAL at 08:12

## 2023-01-02 RX ADMIN — IBUPROFEN 600 MG: 200 TABLET, FILM COATED ORAL at 08:12

## 2023-01-02 ASSESSMENT — PAIN SCALES - GENERAL
PAINLEVEL_OUTOF10: 3
PAINLEVEL_OUTOF10: 0
PAINLEVEL_OUTOF10: 1
PAINLEVEL_OUTOF10: 7
PAINLEVEL_OUTOF10: 7

## 2023-01-02 ASSESSMENT — PAIN DESCRIPTION - LOCATION: LOCATION: ABDOMEN

## 2023-01-02 ASSESSMENT — PAIN - FUNCTIONAL ASSESSMENT: PAIN_FUNCTIONAL_ASSESSMENT: ACTIVITIES ARE NOT PREVENTED

## 2023-01-02 ASSESSMENT — PAIN DESCRIPTION - PAIN TYPE: TYPE: ACUTE PAIN

## 2023-01-02 ASSESSMENT — PAIN DESCRIPTION - DESCRIPTORS: DESCRIPTORS: CRAMPING;DISCOMFORT

## 2023-01-02 ASSESSMENT — PAIN DESCRIPTION - ORIENTATION: ORIENTATION: LOWER;MID

## 2023-01-02 ASSESSMENT — PAIN DESCRIPTION - FREQUENCY: FREQUENCY: INTERMITTENT

## 2023-01-02 ASSESSMENT — PAIN DESCRIPTION - ONSET: ONSET: GRADUAL

## 2023-01-02 NOTE — LACTATION NOTE
Mom reports baby has a shallow latch, but states her milk is in. Waiting for Peds to order an ENT referral due to painful, shallow latch. Hand pump given for home use to use until ebp approved and delivered. Encouraged frequent feeds to establish milk supply. Reviewed benefits and safety of skin to skin. Inst on adequate I/O and importance of keeping track of diapers at home. Instructed on signs of dehydration such as infant refusing to feed, decreased wet diapers and infant becoming listless and notify provider if these occur. Reviewed with mom the importance of notifying the physician if baby looks more jaundiced. Lactation office # given if follow-up needed, as well as other helpful resources. Encouraged to call with any concerns. Support and encouragement given.

## 2023-01-02 NOTE — DISCHARGE SUMMARY
Department of Obstetrics and Gynecology  Labor and Delivery  Discharge Summary    Patient:  Sarah Aragon         Medical Record Number:  [id number]    Admit Date:  2022  6:52 AM    Discharge Date: 2023    Final Diagnosis:   Principal Problem (Resolved):    39 5/7 weeks gestation of pregnancy  Active Problems:    Genetic testing 46XY, positive carrier CF with LOW RISK fetus.  delivery, delivered, current hospitalization    Term birth of  male    Obesity (BMI 30.0-34. 9) Pregatrvid BMI 31.14    ASCUS with positive high risk HPV6/29/22 antepartum Pap cervical -repeat  colp PP    Postoperative anemia due to acute blood loss  Resolved Problems:    High risk multigravida, third trimester    History of  delivery, currently pregnant (fetal intolernce of labor s/p 37w IOL for Preeclampsia)- Repeat LTCS scheduled 21 0700 if labor does not take place sooner    Large for dates affecting management of mother, third trimester, not applicable or unspecified fetus (FH>D @ 32w; 32w US (6# 8oz) 82.3%)    Declines  (vaginal birth after ) trial     macrosomia (4200 g, 10 pounds 2 ounces)    Obesity during pregnancy in third trimester    History of pre-eclampsia in prior pregnancy, currently pregnant, third trimester - started on 81mg ASA    History of spontaneous , currently pregnant, third trimester    Cystic fibrosis carrier, antepartum, third trimester    Herpes simplex infection in mother during third trimester of pregnancy - Valtrex 500mg BID @ 36 weeks      Chief Complaint - Presenting Illness - Physical Findings:   39 weeks gestation of pregnancy [Z3A.39]  HPI/Indication: 31-year-old W female  3 para 2-0-1-2 at 39 weeks 5 days who presents for a repeat low-transverse  section. Patient was initially considering a TOLAC if she went into spontaneous labor prior to her scheduled  section date.   As the due date approached she was leaning more towards a repeat  section due to suspected fetal macrosomia, unfavorable cervix, a floating vertex and she did not want to be induced. Pregnancy was complicated by HSV on Valtrex, history of preeclampsia in prior pregnancy, ASCUS on her Pap smear, cystic fibrosis carrier status and a history of spontaneous . On presenting to labor and delivery she denies contractions, leaking fluid and vaginal bleeding. She has no symptoms of UTI or PIH. There is good fetal movement. Management options were reviewed, the risks of surgery were discussed, including, but not limited to that of anesthesia, infection, hemorrhage, transfusion, blood borne disease, bowel/bladder/ureteral/vascular injury, and any corrective surgeries (bladder, ureter, bowel, hysterectomy), uterine or cervical lacerations, injury to the baby, DVT,PE, pain and death. Questions were answered to both the patient's and FOB's satisfaction and informed consent was obtained to proceed as planned with a repeat low-transverse  delivery.      Hospital Course:   Delivery Summary:  Date of Procedure: 2022     Pre-Op Diagnosis: High risk multigravid IUP at 39 weeks 5 days, previous  section -declines , large for dates, obesity (pregravid BMI 31.14), history of preeclampsia in prior pregnancy, history of spontaneous , cystic fibrosis carrier (low risk to fetus), history of HSV-2, history of PCOS      Patient Active Problem List   Diagnosis    Genital herpes    PCOS (polycystic ovarian syndrome)    High risk multigravida, third trimester    History of  delivery, currently pregnant (fetal intolernce of labor s/p 37w IOL for Preeclampsia)- Repeat LTCS scheduled 21 0700 if labor does not take place sooner    Declines  (vaginal birth after ) trial    Large for dates affecting management of mother, third trimester, not applicable or unspecified fetus (FH>D @ 32w; 34w US (6# 8oz) 82.3%)    Obesity (BMI 30.0-34. 9) Pregatrvid BMI 31.14    Obesity during pregnancy in third trimester    History of pre-eclampsia in prior pregnancy, currently pregnant, third trimester - started on 81mg ASA    ASCUS with positive high risk HPV6/29/22 antepartum Pap cervical -repeat  colp PP    History of spontaneous , currently pregnant, third trimester    Cystic fibrosis carrier, antepartum, third trimester    Herpes simplex infection in mother during third trimester of pregnancy - Valtrex 500mg BID @ 36 weeks    Genetic testing 46XY, positive carrier CF with LOW RISK fetus. 44 5/7 weeks gestation of pregnancy      Post-Op Diagnosis: Same, macrosomic male infant delivered         Patient Active Problem List   Diagnosis    Genital herpes    PCOS (polycystic ovarian syndrome)    High risk multigravida, third trimester    History of  delivery, currently pregnant (fetal intolernce of labor s/p 37w IOL for Preeclampsia)- Repeat LTCS scheduled 21 0700 if labor does not take place sooner    Declines  (vaginal birth after ) trial    Large for dates affecting management of mother, third trimester, not applicable or unspecified fetus (FH>D @ 32w; 34w US (6# 8oz) 82.3%)    Obesity (BMI 30.0-34. 9) Pregatrvid BMI 31.14    Obesity during pregnancy in third trimester    History of pre-eclampsia in prior pregnancy, currently pregnant, third trimester - started on 81mg ASA    ASCUS with positive high risk HPV6/29/22 antepartum Pap cervical -repeat  colp PP    History of spontaneous , currently pregnant, third trimester    Cystic fibrosis carrier, antepartum, third trimester    Herpes simplex infection in mother during third trimester of pregnancy - Valtrex 500mg BID @ 36 weeks    Genetic testing 46XY, positive carrier CF with LOW RISK fetus.     44 5/7 weeks gestation of pregnancy     delivery, delivered, current hospitalization    Term birth of  male     macrosomia (4200 g, 10 pounds 2 ounces)      Procedure(s): Repeat Low Transverse  Section Via Pfannenstiel Skin Incision     Surgeon(s): Inge Coyne MD     Assistant: Nancy Prasad DO     Anesthesia: Spinal with Duramorph     Complications: None     Estimated Blood Loss (mL): 500     Fluids Place (mL): 2000, crystalloid    Urine Output (mL): 300, clear      Drains: Minor to gravity     Intraoperative Medications: Clindamycin 900 mg and gentamicin 500 mg IV on-call to the OR, IV pitocin drip post placenta, 30 mg IV Toradol at conclusion of case     Findings:  Live Born 2022 at 9:38 AM  Sex: Male  Fetal Position:  Cephalic, occiput anterior  Apgars:  1 minute:  8; 5 minute:  9  Weight:  10# 2oz, 4600 grams  Nuchal Cord: was not present  Placenta: was delivered with gentle traction and was noted to be intact, whole, and that the umbilical cord had three vessels noted. Tubes, uterus, ovaries:  Normal     Specimens:   * No specimens in log *     Implants:  * No implants in log *      Condition:    Infant stable, transfer to post anesthesia recovery then [address]  Mother stable, transfer to post anesthesia recovery then to Maternity    The patient was transferred to the recovery room with her IV, Minor, and SCD's from OR in place, where she was given IV Toradol in addition to p.o. Roxicodone and Tylenol to supplement her Duramorph Spinal for pain management. Her IV antibiotics were continued for 24 hour coverage. On the first postoperative day her IV, IVF, IV medications, SCD's and Minor were discontinued. She was started on PO pain meds (Roxicodone, ibuprofen). She was encouraged to advance her diet and activities as tolerated. The patient had an unremarkable Hospital Course. Her care was advanced per routine protocol. Her vital signs were stable, she remained afebrile, and her physical exam was unremarkable throughout her hospitalization.   She was subsequently discharged home on the third Hospital Day as she was tolerating her diet, ambulating well, voiding without difficulty and had appropriate flatus without urge for a bowel movement. Recent Results (from the past 72 hour(s))   CBC auto differential    Collection Time: 12/31/22  5:20 AM   Result Value Ref Range    WBC 11.7 (H) 4.5 - 11.5 E9/L    RBC 3.48 (L) 3.50 - 5.50 E12/L    Hemoglobin 10.4 (L) 11.5 - 15.5 g/dL    Hematocrit 31.4 (L) 34.0 - 48.0 %    MCV 90.2 80.0 - 99.9 fL    MCH 29.9 26.0 - 35.0 pg    MCHC 33.1 32.0 - 34.5 %    RDW 13.1 11.5 - 15.0 fL    Platelets 855 034 - 226 E9/L    MPV 9.6 7.0 - 12.0 fL    Neutrophils % 67.2 43.0 - 80.0 %    Immature Granulocytes % 1.1 0.0 - 5.0 %    Lymphocytes % 18.9 (L) 20.0 - 42.0 %    Monocytes % 10.6 2.0 - 12.0 %    Eosinophils % 1.9 0.0 - 6.0 %    Basophils % 0.3 0.0 - 2.0 %    Neutrophils Absolute 7.88 (H) 1.80 - 7.30 E9/L    Immature Granulocytes # 0.13 E9/L    Lymphocytes Absolute 2.21 1.50 - 4.00 E9/L    Monocytes Absolute 1.24 (H) 0.10 - 0.95 E9/L    Eosinophils Absolute 0.22 0.05 - 0.50 E9/L    Basophils Absolute 0.04 0.00 - 0.20 E9/L     Physicians Following Patient During Hospitalization - Reason For Care:  Admitting Physician: Scott Rock  Operating Physician: Jarrod Carter Physician(s):    POD#1 Sugar   POD#2 Scott Rock   POD#3 Scott Rock  Discharging Physician: Scott Rock  Consultant(s): n/a    Discharge Condition:  Level of Function:  Stable  Caregiver Arrangements and Educational Efforts: Written Discharge Instructions were verbally reviewed and given to the Patient. Special Problems: None    Plans For Continuing Care:  Unreported Test Results and Intended Follow-Up: 1 week(s) time. Instructions for Physical Activity: No driving for 2 week(s). No sex or tampon use for 6 weeks. No douching. No heavy lifting (greater than baby and carrier) for 6 weeks. Frequent ambulation with stairs - start slowly then increase as tolerated. Return to work in 10 -8 weeks.   Showers are fine - avoid bath tubs, hot tubs, swimming. Instructions for Diet: Regular diet  Discharge Medications:  Current Discharge Medication List        START taking these medications    Details   docusate sodium (COLACE, DULCOLAX) 100 MG CAPS Take 100 mg by mouth 2 times daily as needed for Constipation      ibuprofen (ADVIL;MOTRIN) 600 MG tablet Take 1 tablet by mouth every 6 hours as needed (Pain 1-10)  Qty: 60 tablet, Refills: 0      lansinoh lanolin CREA ointment Apply 1 application topically every hour as needed for Dry Skin (nipple discomfort)      oxyCODONE (ROXICODONE) 5 MG immediate release tablet Take 1 tablet by mouth every 6 hours as needed for Pain for up to 5 days. Max Daily Amount: 20 mg  Qty: 20 tablet, Refills: 0    Comments: Reduce doses taken as pain becomes manageable  Associated Diagnoses:  delivery, delivered, current hospitalization; Postoperative pain      simethicone (MYLICON) 80 MG chewable tablet Take 1 tablet by mouth every 6 hours as needed for Flatulence  Refills: 0      acetaminophen (TYLENOL) 500 MG tablet Take 2 tablets by mouth every 6 hours as needed for Pain  Refills: 0           CONTINUE these medications which have NOT CHANGED    Details   Prenatal Vit-Fe Fumarate-FA (PRENATAL PO) Take by mouth           STOP taking these medications       valACYclovir (VALTREX) 500 MG tablet Comments:   Reason for Stopping:         aspirin 81 MG EC tablet Comments:   Reason for Stopping: Follow-Up Visit Plan: In 1 week for incision check and in 6-8  weeks for final postpartum visit. Disposition: Home    Time Spent on Discharge:  30 minutes were spent in patient examination, evaluation, counseling as well as medication reconciliation, prescriptions for required medications, discharge plan and follow up.       Cornell Martinez MD MD,FACOG    2023 12:33 PM

## 2023-01-02 NOTE — DISCHARGE INSTR - DIET

## 2023-01-02 NOTE — PROGRESS NOTES
POD #3    Patient:  Emmy Louis Date:  12/30/2022  6:52 AM  Medical Record Number:  [id number]   Today's Date: 1/2/2023    S: No complaints, doing well. Ready to go home today; tolerating diet: yes -general; ambulating well: yes -up in room and in halls; voiding without difficulty:  yes -has no urinary complaints; bm: denies urge; flatus: yes - normal; pain meds appropriate: yes -Roxicodone and ibuprofen; vaginal bleeding: staining only, thin lochia.     O:   Vitals:    01/01/23 0736 01/01/23 1343 01/01/23 2306 01/02/23 0750   BP: (!) 121/58 (!) 140/63 119/68 122/72   Pulse: 90 (!) 105 97 91   Resp: 16 18 16 18   Temp: 98.1 °F (36.7 °C) 97.7 °F (36.5 °C) 98.2 °F (36.8 °C) 98.7 °F (37.1 °C)   TempSrc: Oral Oral Oral Oral   SpO2: 99% 98% 100% 100%     Gen: A&O, cooperative, pleasant   Heart: RRR   Lungs: CTA b/l   Abd; soft, NT, non distended, +BS  Back: no CVA or paraspinal tenderness   Ext: No clubbing, cyanosis, edema:trace , no cords palpable, no calf tenderness   Neuro: intact   Inc: clean, dry, intact with Dermabond  Uterus: firm, well contracted, nt   Heather pad: staining only, thin lochia    Recent Results (from the past 72 hour(s))   CBC auto differential    Collection Time: 12/31/22  5:20 AM   Result Value Ref Range    WBC 11.7 (H) 4.5 - 11.5 E9/L    RBC 3.48 (L) 3.50 - 5.50 E12/L    Hemoglobin 10.4 (L) 11.5 - 15.5 g/dL    Hematocrit 31.4 (L) 34.0 - 48.0 %    MCV 90.2 80.0 - 99.9 fL    MCH 29.9 26.0 - 35.0 pg    MCHC 33.1 32.0 - 34.5 %    RDW 13.1 11.5 - 15.0 fL    Platelets 623 087 - 666 E9/L    MPV 9.6 7.0 - 12.0 fL    Neutrophils % 67.2 43.0 - 80.0 %    Immature Granulocytes % 1.1 0.0 - 5.0 %    Lymphocytes % 18.9 (L) 20.0 - 42.0 %    Monocytes % 10.6 2.0 - 12.0 %    Eosinophils % 1.9 0.0 - 6.0 %    Basophils % 0.3 0.0 - 2.0 %    Neutrophils Absolute 7.88 (H) 1.80 - 7.30 E9/L    Immature Granulocytes # 0.13 E9/L    Lymphocytes Absolute 2.21 1.50 - 4.00 E9/L    Monocytes Absolute 1.24 (H) 0.10 - 0.95 E9/L    Eosinophils Absolute 0.22 0.05 - 0.50 E9/L    Basophils Absolute 0.04 0.00 - 0.20 E9/L       A: 29 y.o. female [id number] at 38w11d  POD#3 S/P repeat low transverse  section  Postoperative anemia due to acute blood loss  Patient Active Problem List   Diagnosis    Genital herpes    PCOS (polycystic ovarian syndrome)    High risk multigravida, third trimester    History of  delivery, currently pregnant (fetal intolernce of labor s/p 37w IOL for Preeclampsia)- Repeat LTCS scheduled 21 0700 if labor does not take place sooner    Declines  (vaginal birth after ) trial    Large for dates affecting management of mother, third trimester, not applicable or unspecified fetus (FH>D @ 32w; 34w US (6# 8oz) 82.3%)    Obesity (BMI 30.0-34. 9) Pregatrvid BMI 31.14    Obesity during pregnancy in third trimester    History of pre-eclampsia in prior pregnancy, currently pregnant, third trimester - started on 81mg ASA    ASCUS with positive high risk HPV6/29/22 antepartum Pap cervical -repeat  colp PP    History of spontaneous , currently pregnant, third trimester    Cystic fibrosis carrier, antepartum, third trimester    Herpes simplex infection in mother during third trimester of pregnancy - Valtrex 500mg BID @ 36 weeks    Genetic testing 46XY, positive carrier CF with LOW RISK fetus. 44 5/7 weeks gestation of pregnancy     delivery, delivered, current hospitalization    Term birth of  male     macrosomia (4200 g, 10 pounds 2 ounces)    Postoperative anemia due to acute blood loss       P: Routine post-op care. Discharge home with instructions, precautions. Prescriptions for Roxicodone and ibuprofen 600 mg. May use over-the-counter Tylenol as needed. May continue over-the-counter Simethicone and Colace PRN. Continue PNV with Iron. Follow-up office 1 week for incision check, and 6-8 weeks for final post-op visit.     Zelalem Darnell MD FACOG  1/2/2023 12:00 PM

## 2023-01-02 NOTE — DISCHARGE INSTR - ACTIVITY
After Your Delivery Discharge Instructions    What to do after you leave the hospital:  Recommended diet: regular diet    Recommended activity: No driving for 2 weeks. No sex or tampon use for 6 weeks. No douching. No heavy lifting (greater than baby and carrier) for 6 weeks. Initially, avoid frequent ambulation with stairs - start slowly then increase as tolerated. You may return to work in 10 -8 weeks. Showers are fine - avoid bath tubs, hot tubs, swimming. Follow-up in 1 week for incision check and in 6 weeks for final postpartum visit. Call the Physician with any of these  signs and symptoms:    Warning signs regarding incision:  \"Popping\" of stitches or staples  Foul smelling discharge or pus  More redness or streaks around surgical incision than before    Incision care:  Keep incision uncovered  No tub baths until OK'd by your Physician      After your delivery - signs and symptoms to watch for:  Fever - Oral temperature greater than 100.4 degrees Fahrenheit  Foul-smelling vaginal discharge  Headache unrelieved by \"pain meds\"  Difficulty urinating  Breasts reddened, hard, hot to the touch  Nipple discharge which is foul-smelling or contains pus  Increased pain at the site of the surgical incision  Difficulty breathing with or without chest pain  New calf pain especially if only on one side  Sudden, continuing increased vaginal bleeding (heavier than a period) with or without clots  Unrelieved feelings of:   Inability to cope  Sadness  Anxiety  Lack of interest in baby  Insomnia  Crying     What to do at home:  Resume activity gradually   Don't lift anything heavier than baby and carrier until OK'd by your Physician   No sex until OK'd by your Physician  Eat regular nutritious meals  Take pain medication as prescribed whenever you need them  To avoid/relieve constipation take stool softeners if advised   Increase fiber in your diet    Most importantly ENJOY your Baby!  - Dr. Marianna Zee =)))    Please call immediately with Questions and Concerns - [phone] (Office) or [phone] (Answering Service) after hours and weekends. By signing below, I understand that if any emergent problems occur, once I leave the hospital, I am to dial #[phone] or go immediately to the nearest Emergency Room. For less emergent matters,  Dr. Linda Burnett can be reached by calling his Office or  answering service at the above numbers. I understand and acknowledge receipt of the instructions indicated above.

## 2023-01-02 NOTE — PROGRESS NOTES
POD #2    Patient:  Nafisa York Date:  12/30/2022  6:52 AM  Medical Record Number:  [id number]   Today's Date: 1/1/2023    S: No complaints, doing well; tolerating diet: yes -general; ambulating well: yes -up in room and in halls; voiding without difficulty:  yes -has no urinary complaints; bm: denies; flatus: yes -some, as more yesterday; pain meds appropriate: yes -ibuprofen 600 mg and Roxicodone; vaginal bleeding: Less than a period.     O:   Vitals:    12/31/22 1500 12/31/22 2338 01/01/23 0736 01/01/23 1343   BP: 123/72 130/75 (!) 121/58 (!) 140/63   Pulse: 81 90 90 (!) 105   Resp: 16 16 16 18   Temp:  98 °F (36.7 °C) 98.1 °F (36.7 °C) 97.7 °F (36.5 °C)   TempSrc: Oral Oral Oral Oral   SpO2: 98% 97% 99% 98%     Gen: A&O, cooperative, pleasant   Heart: RRR   Lungs: CTA b/l   Abd; soft, NT, non distended, +BS  Back: no CVA or paraspinal tenderness   Ext: No clubbing, cyanosis, edema:trace , no cords palpable, no calf tenderness   Neuro: intact   Inc: clean, dry, intact with Dermabond  Uterus: firm, well contracted, nt   Heather pad: staining only, thin lochia    Recent Results (from the past 72 hour(s))   URINE DRUG SCREEN    Collection Time: 12/30/22  8:05 AM   Result Value Ref Range    Amphetamine Screen, Urine NOT DETECTED Negative <1000 ng/mL    Barbiturate Screen, Ur NOT DETECTED Negative < 200 ng/mL    Benzodiazepine Screen, Urine NOT DETECTED Negative < 200 ng/mL    Cannabinoid Scrn, Ur NOT DETECTED Negative < 50ng/mL    Cocaine Metabolite Screen, Urine NOT DETECTED Negative < 300 ng/mL    Opiate Scrn, Ur NOT DETECTED Negative < 300ng/mL    PCP Screen, Urine NOT DETECTED Negative < 25 ng/mL    Methadone Screen, Urine NOT DETECTED Negative <300 ng/mL    Oxycodone Urine NOT DETECTED Negative <100 ng/mL    FENTANYL SCREEN, URINE NOT DETECTED Negative <1 ng/mL    Drug Screen Comment: see below    TYPE AND SCREEN    Collection Time: 12/30/22  8:05 AM   Result Value Ref Range    ABO/Rh O POS     Antibody Screen NEG    CBC    Collection Time: 22  8:05 AM   Result Value Ref Range    WBC 11.8 (H) 4.5 - 11.5 E9/L    RBC 4.06 3.50 - 5.50 E12/L    Hemoglobin 12.1 11.5 - 15.5 g/dL    Hematocrit 35.9 34.0 - 48.0 %    MCV 88.4 80.0 - 99.9 fL    MCH 29.8 26.0 - 35.0 pg    MCHC 33.7 32.0 - 34.5 %    RDW 13.1 11.5 - 15.0 fL    Platelets 117 352 - 186 E9/L    MPV 9.4 7.0 - 12.0 fL   CBC auto differential    Collection Time: 22  5:20 AM   Result Value Ref Range    WBC 11.7 (H) 4.5 - 11.5 E9/L    RBC 3.48 (L) 3.50 - 5.50 E12/L    Hemoglobin 10.4 (L) 11.5 - 15.5 g/dL    Hematocrit 31.4 (L) 34.0 - 48.0 %    MCV 90.2 80.0 - 99.9 fL    MCH 29.9 26.0 - 35.0 pg    MCHC 33.1 32.0 - 34.5 %    RDW 13.1 11.5 - 15.0 fL    Platelets 246 803 - 987 E9/L    MPV 9.6 7.0 - 12.0 fL    Neutrophils % 67.2 43.0 - 80.0 %    Immature Granulocytes % 1.1 0.0 - 5.0 %    Lymphocytes % 18.9 (L) 20.0 - 42.0 %    Monocytes % 10.6 2.0 - 12.0 %    Eosinophils % 1.9 0.0 - 6.0 %    Basophils % 0.3 0.0 - 2.0 %    Neutrophils Absolute 7.88 (H) 1.80 - 7.30 E9/L    Immature Granulocytes # 0.13 E9/L    Lymphocytes Absolute 2.21 1.50 - 4.00 E9/L    Monocytes Absolute 1.24 (H) 0.10 - 0.95 E9/L    Eosinophils Absolute 0.22 0.05 - 0.50 E9/L    Basophils Absolute 0.04 0.00 - 0.20 E9/L       A: 29 y.o. female [id number] at 38w11d  POD#2 S/P repeat low transverse  section  Postoperative anemia due to acute blood loss  Patient Active Problem List   Diagnosis    Genital herpes    PCOS (polycystic ovarian syndrome)    High risk multigravida, third trimester    History of  delivery, currently pregnant (fetal intolernce of labor s/p 37w IOL for Preeclampsia)- Repeat LTCS scheduled 21 0700 if labor does not take place sooner    Declines  (vaginal birth after ) trial    Large for dates affecting management of mother, third trimester, not applicable or unspecified fetus (FH>D @ 32w; 34w US (6# 8oz) 82.3%)    Obesity (BMI 30.0-34. 9) Pregatrvid BMI 31.14    Obesity during pregnancy in third trimester    History of pre-eclampsia in prior pregnancy, currently pregnant, third trimester - started on 81mg ASA    ASCUS with positive high risk HPV6/29/22 antepartum Pap cervical -repeat  colp PP    History of spontaneous , currently pregnant, third trimester    Cystic fibrosis carrier, antepartum, third trimester    Herpes simplex infection in mother during third trimester of pregnancy - Valtrex 500mg BID @ 36 weeks    Genetic testing 46XY, positive carrier CF with LOW RISK fetus. 44 5/7 weeks gestation of pregnancy     delivery, delivered, current hospitalization    Term birth of  male     macrosomia (4200 g, 10 pounds 2 ounces)    Postoperative anemia due to acute blood loss       P: Routine post-op care. Encourage advancements in diet and activity. Continue prenatal vitamins and iron daily. Continue ibuprofen and Roxicodone as needed for pain. Dulcolax suppository and/or 1-2-3 (or Fleets) enema PRN. Anticipate home tomorrow.     Abbe Lawson MD FACOG  2023 7:22 PM

## 2023-01-02 NOTE — PLAN OF CARE
Problem: Postpartum  Goal: Experiences normal postpartum course  Description:  Postpartum OB-Pregnancy care plan goal which identifies if the mother is experiencing a normal postpartum course  2023 by Hanh Saavedra RN  Outcome: Progressing     Problem: Postpartum  Goal: Appropriate maternal -  bonding  Description:  Postpartum OB-Pregnancy care plan goal which identifies if the mother and  are bonding appropriately  2023 by Hanh Saavedra RN  Outcome: Progressing     Problem: Postpartum  Goal: Establishment of infant feeding pattern  Description:  Postpartum OB-Pregnancy care plan goal which identifies if the mother is establishing a feeding pattern with their   2023 by Hanh Saavedra RN  Outcome: Progressing     Problem: Postpartum  Goal: Incisions, wounds, or drain sites healing without S/S of infection  2023 by Hanh Saavedra RN  Outcome: Progressing     Problem: Pain  Goal: Verbalizes/displays adequate comfort level or baseline comfort level  2023 by Hanh Saavedra RN  Outcome: Progressing     Problem: Infection - Adult  Goal: Absence of infection at discharge  2023 by Hanh Saavedra RN  Outcome: Progressing     Problem: Infection - Adult  Goal: Absence of infection during hospitalization  2023 by Hahn Saavedra RN  Outcome: Progressing     Problem: Infection - Adult  Goal: Absence of fever/infection during anticipated neutropenic period  2023 by Hanh Saavedra RN  Outcome: Progressing     Problem: Safety - Adult  Goal: Free from fall injury  2023 by Hanh Saavedra RN  Outcome: Progressing     Problem: Discharge Planning  Goal: Discharge to home or other facility with appropriate resources  2023 by Hanh Saavedra RN  Outcome: Progressing

## 2023-01-02 NOTE — DISCHARGE INSTRUCTIONS

## 2023-02-01 PROBLEM — Z13.79 GENETIC TESTING: Status: RESOLVED | Noted: 2022-12-28 | Resolved: 2023-02-01

## 2023-05-30 ENCOUNTER — HOSPITAL ENCOUNTER (OUTPATIENT)
Age: 33
Discharge: HOME | End: 2023-06-01

## 2023-05-30 ENCOUNTER — OUTSIDE SERVICES (OUTPATIENT)
Dept: OBGYN | Age: 33
End: 2023-05-30

## 2023-05-30 DIAGNOSIS — N87.1 MODERATE DYSPLASIA OF CERVIX (CIN II): Primary | ICD-10-CM

## 2023-05-30 DIAGNOSIS — R87.610 ASCUS WITH POSITIVE HIGH RISK HPV CERVICAL: ICD-10-CM

## 2023-05-30 DIAGNOSIS — R87.810 ASCUS WITH POSITIVE HIGH RISK HPV CERVICAL: ICD-10-CM

## 2023-05-30 NOTE — OP NOTE
section. The Kevangeliaian curet was then used to perform a thorough curettage of the endocervical canal.  The specimen was collected and sent to pathology for permanent section. The 5 mm ball electrode was then used to cauterize the excised edges of the cervix to both ensure hemostasis and adequacy of margins. Monsel solution was applied to the cervix to further ensure hemostasis. Gelfoam was obtained and packed into the cervical crater as an additional precaution. The speculum was then slowly withdrawn from the vagina under direct colposcopic visualization all evaluating the vaginal mucosa. Lesions were identified. All instrumentation was then removed from the patient's vagina. The vulva, perineum and perianal regions were systematically inspected in a similar fashion. Acetowhite epithelium was visualized at the introitus on the left at the 3 o'clock position extending to the vulva laterally. The area measured 20 x 10 mm in size. Electrocautery was applied to the area in order to fulgurate the lesion. Silvadene cream was applied to the burns. After a thorough colposcopic evaluation had been performed, the patient was returned to the dorsal supine position. Anesthesia was reversed without difficulty . The patient was transferred to the recovery room in a stable postoperative state. She will be discharged home with instructions to follow-up in the office in 2 weeks' time. She is to call for fevers, severe abdominal pain, bleeding heavier than a period and questions or concerns. She is to avoid heavy lifting and straining and have nothing in vagina. There is to be no sex, tampons, douching, tub bathing, swimming or hot tubs as well over the next 2-4 weeks' time. She is to shower only. She is to clean the vulvar area with clean soapy water 3 times daily after which she is to apply a thin layer of Silvadene cream.  She is to leave the area open to air is much as possible.   She will be reassessed in the

## 2024-02-22 ENCOUNTER — HOSPITAL ENCOUNTER (EMERGENCY)
Age: 34
Discharge: LEFT BEFORE DISCHARGE | End: 2024-02-22
Payer: COMMERCIAL

## 2024-02-22 VITALS
SYSTOLIC BLOOD PRESSURE: 127 MMHG | HEART RATE: 89 BPM | RESPIRATION RATE: 16 BRPM | BODY MASS INDEX: 29.35 KG/M2 | OXYGEN SATURATION: 97 % | WEIGHT: 205 LBS | HEIGHT: 70 IN | TEMPERATURE: 97.8 F | DIASTOLIC BLOOD PRESSURE: 88 MMHG

## 2024-02-22 DIAGNOSIS — Z53.21 ELOPED FROM EMERGENCY DEPARTMENT: Primary | ICD-10-CM

## 2024-02-22 PROCEDURE — 99281 EMR DPT VST MAYX REQ PHY/QHP: CPT

## 2024-02-22 ASSESSMENT — PAIN - FUNCTIONAL ASSESSMENT: PAIN_FUNCTIONAL_ASSESSMENT: NONE - DENIES PAIN

## 2024-02-22 NOTE — ED NOTES
Department of Emergency Medicine  FIRST PROVIDER TRIAGE NOTE             Independent MLP           2/22/24  6:58 PM EST    Date of Encounter: 2/22/24   MRN: [id number]      HPI: Brea Mares is a 34 y.o. female who presents to the ED for Allergic Reaction (Recently changed laundry detergent and thinks is having a reaction to it, hives, denies trouble breathing)  Switched laundry detergent, states she has hives on legs and arms    ROS: Negative for cp or sob.    PE: CV: regular rate  Pulm: CTA bilat     Initial Plan of Care: All treatment areas with department are currently occupied. Plan to order/Initiate the following while awaiting opening in ED:  Initiate Treatment-Testing, Proceed toTreatment Area When Bed Available for ED Attending/MLP to Continue Care    Electronically signed by JULIANO Blanchard NP   DD: 2/22/24       Mike Tucker APRN - NP  02/22/24 [id number]

## 2024-02-23 NOTE — ED PROVIDER NOTES
Patient eloped from the emergency department prior to being evaluated by this provider.  Patient did have first provider contact in triage and note dictated.     Melissa Thompson, JULIANO - CNP  02/22/24 [id number]

## 2024-03-12 ENCOUNTER — NURSE TRIAGE (OUTPATIENT)
Dept: OTHER | Facility: CLINIC | Age: 34
End: 2024-03-12

## 2024-03-12 ENCOUNTER — TELEPHONE (OUTPATIENT)
Dept: PRIMARY CARE CLINIC | Age: 34
End: 2024-03-12

## 2024-03-12 NOTE — TELEPHONE ENCOUNTER
Location of patient: [place]    Received call from Shahida at [facility], [place]; Patient with Red Flag Complaint requesting to establish care with [place].    Subjective: Caller states \"Hands swelling\"     Current Symptoms: bilateral hand swelling, feels tight. Some parts of fingers red. Can make a fist and can  items.    Joints also has some swelling      Onset: a few weeks ago; waxing and waning    Associated Symptoms: NA    Pain Severity: 5/10; feels like tension; intermittent    Temperature: none     What has been tried: nothing    LMP: NA Pregnant: No    Recommended disposition: See PCP within 3 Days    Walk in clinic if no appts.      Care advice provided, patient verbalizes understanding; denies any other questions or concerns; instructed to call back for any new or worsening symptoms.    Patient/Caller agrees with recommended disposition; writer provided warm transfer to Mary at [facility], [place] for appointment scheduling    Attention Provider:  Thank you for allowing me to participate in the care of your patient.  The patient was connected to triage in response to information provided to the [facility].  Please do not respond through this encounter as the response is not directed to a shared pool.        Reason for Disposition   [1] MILD swelling (puffiness) of both hands AND [2] not better after 3 days    Protocols used: Hand Swelling-ADULT-AH

## 2024-04-07 SDOH — HEALTH STABILITY: PHYSICAL HEALTH: ON AVERAGE, HOW MANY DAYS PER WEEK DO YOU ENGAGE IN MODERATE TO STRENUOUS EXERCISE (LIKE A BRISK WALK)?: 4 DAYS

## 2024-04-07 SDOH — HEALTH STABILITY: PHYSICAL HEALTH: ON AVERAGE, HOW MANY MINUTES DO YOU ENGAGE IN EXERCISE AT THIS LEVEL?: 40 MIN

## 2024-04-10 ENCOUNTER — OFFICE VISIT (OUTPATIENT)
Dept: FAMILY MEDICINE CLINIC | Age: 34
End: 2024-04-10

## 2024-04-10 VITALS
OXYGEN SATURATION: 97 % | WEIGHT: 220 LBS | HEART RATE: 88 BPM | SYSTOLIC BLOOD PRESSURE: 112 MMHG | HEIGHT: 70 IN | TEMPERATURE: 97.4 F | DIASTOLIC BLOOD PRESSURE: 72 MMHG | BODY MASS INDEX: 31.5 KG/M2 | RESPIRATION RATE: 18 BRPM

## 2024-04-10 DIAGNOSIS — B96.89 ACUTE BACTERIAL SINUSITIS: ICD-10-CM

## 2024-04-10 DIAGNOSIS — M25.50 ARTHRALGIA OF MULTIPLE JOINTS: ICD-10-CM

## 2024-04-10 DIAGNOSIS — M79.10 MYALGIA: ICD-10-CM

## 2024-04-10 DIAGNOSIS — L65.9 HAIR LOSS: ICD-10-CM

## 2024-04-10 DIAGNOSIS — J01.90 ACUTE BACTERIAL SINUSITIS: ICD-10-CM

## 2024-04-10 DIAGNOSIS — R53.83 FATIGUE, UNSPECIFIED TYPE: ICD-10-CM

## 2024-04-10 DIAGNOSIS — Z00.00 ENCOUNTER FOR MEDICAL EXAMINATION TO ESTABLISH CARE: Primary | ICD-10-CM

## 2024-04-10 PROBLEM — G43.909 MIGRAINE, UNSPECIFIED, NOT INTRACTABLE, WITHOUT STATUS MIGRAINOSUS: Status: ACTIVE | Noted: 2022-01-20

## 2024-04-10 LAB
ALBUMIN SERPL-MCNC: 4.8 G/DL (ref 3.5–5.2)
ALP BLD-CCNC: 97 U/L (ref 35–104)
ALT SERPL-CCNC: 19 U/L (ref 0–32)
ANION GAP SERPL CALCULATED.3IONS-SCNC: 18 MMOL/L (ref 7–16)
AST SERPL-CCNC: 16 U/L (ref 0–31)
BASOPHILS ABSOLUTE: 0.03 K/UL (ref 0–0.2)
BASOPHILS RELATIVE PERCENT: 0 % (ref 0–2)
BILIRUB SERPL-MCNC: 0.5 MG/DL (ref 0–1.2)
BUN BLDV-MCNC: 21 MG/DL (ref 6–20)
C-REACTIVE PROTEIN: 12 MG/L (ref 0–5)
CALCIUM SERPL-MCNC: 9.6 MG/DL (ref 8.6–10.2)
CHLORIDE BLD-SCNC: 101 MMOL/L (ref 98–107)
CO2: 21 MMOL/L (ref 22–29)
CREAT SERPL-MCNC: 0.8 MG/DL (ref 0.5–1)
EOSINOPHILS ABSOLUTE: 0.17 K/UL (ref 0.05–0.5)
EOSINOPHILS RELATIVE PERCENT: 2 % (ref 0–6)
GFR SERPL CREATININE-BSD FRML MDRD: >90 ML/MIN/1.73M2
GLUCOSE BLD-MCNC: 79 MG/DL (ref 74–99)
HCT VFR BLD CALC: 40 % (ref 34–48)
HEMOGLOBIN: 13 G/DL (ref 11.5–15.5)
IMMATURE GRANULOCYTES %: 0 % (ref 0–5)
IMMATURE GRANULOCYTES ABSOLUTE: <0.03 K/UL (ref 0–0.58)
LYMPHOCYTES ABSOLUTE: 2.11 K/UL (ref 1.5–4)
LYMPHOCYTES RELATIVE PERCENT: 22 % (ref 20–42)
MCH RBC QN AUTO: 29.7 PG (ref 26–35)
MCHC RBC AUTO-ENTMCNC: 32.5 G/DL (ref 32–34.5)
MCV RBC AUTO: 91.3 FL (ref 80–99.9)
MONOCYTES ABSOLUTE: 0.62 K/UL (ref 0.1–0.95)
MONOCYTES RELATIVE PERCENT: 6 % (ref 2–12)
NEUTROPHILS ABSOLUTE: 6.71 K/UL (ref 1.8–7.3)
NEUTROPHILS RELATIVE PERCENT: 70 % (ref 43–80)
PDW BLD-RTO: 11.9 % (ref 11.5–15)
PLATELET # BLD: 258 K/UL (ref 130–450)
PMV BLD AUTO: 9.2 FL (ref 7–12)
POTASSIUM SERPL-SCNC: 4.4 MMOL/L (ref 3.5–5)
RBC # BLD: 4.38 M/UL (ref 3.5–5.5)
RHEUMATOID FACTOR: <10 IU/ML (ref 0–13)
SODIUM BLD-SCNC: 140 MMOL/L (ref 132–146)
TOTAL PROTEIN: 8.4 G/DL (ref 6.4–8.3)
TSH SERPL DL<=0.05 MIU/L-ACNC: 1.5 UIU/ML (ref 0.27–4.2)
VITAMIN D 25-HYDROXY: 26.6 NG/ML (ref 30–100)
WBC # BLD: 9.7 K/UL (ref 4.5–11.5)

## 2024-04-10 RX ORDER — LEVONORGESTREL 52 MG/1
1 INTRAUTERINE DEVICE INTRAUTERINE ONCE
COMMUNITY

## 2024-04-10 RX ORDER — AZITHROMYCIN 250 MG/1
TABLET, FILM COATED ORAL
Qty: 6 TABLET | Refills: 0 | Status: SHIPPED | OUTPATIENT
Start: 2024-04-10 | End: 2024-04-20

## 2024-04-10 SDOH — ECONOMIC STABILITY: FOOD INSECURITY: WITHIN THE PAST 12 MONTHS, THE FOOD YOU BOUGHT JUST DIDN'T LAST AND YOU DIDN'T HAVE MONEY TO GET MORE.: NEVER TRUE

## 2024-04-10 SDOH — ECONOMIC STABILITY: HOUSING INSECURITY
IN THE LAST 12 MONTHS, WAS THERE A TIME WHEN YOU DID NOT HAVE A STEADY PLACE TO SLEEP OR SLEPT IN A SHELTER (INCLUDING NOW)?: NO

## 2024-04-10 SDOH — ECONOMIC STABILITY: INCOME INSECURITY: HOW HARD IS IT FOR YOU TO PAY FOR THE VERY BASICS LIKE FOOD, HOUSING, MEDICAL CARE, AND HEATING?: NOT HARD AT ALL

## 2024-04-10 SDOH — ECONOMIC STABILITY: FOOD INSECURITY: WITHIN THE PAST 12 MONTHS, YOU WORRIED THAT YOUR FOOD WOULD RUN OUT BEFORE YOU GOT MONEY TO BUY MORE.: NEVER TRUE

## 2024-04-10 ASSESSMENT — PATIENT HEALTH QUESTIONNAIRE - PHQ9
SUM OF ALL RESPONSES TO PHQ QUESTIONS 1-9: 0
SUM OF ALL RESPONSES TO PHQ QUESTIONS 1-9: 0
SUM OF ALL RESPONSES TO PHQ9 QUESTIONS 1 & 2: 0
1. LITTLE INTEREST OR PLEASURE IN DOING THINGS: NOT AT ALL
SUM OF ALL RESPONSES TO PHQ QUESTIONS 1-9: 0
2. FEELING DOWN, DEPRESSED OR HOPELESS: NOT AT ALL
SUM OF ALL RESPONSES TO PHQ QUESTIONS 1-9: 0

## 2024-04-10 NOTE — PROGRESS NOTES
HPI:  The patient is a 34 y.o. female who presents today to establish care.    Has not had a primary care doctor since living in [place].    The patient complains of possible sinus infection.  She states that she has been sick for about 10 days.  She states that she has ear pain, congestion, sinus pressure.  She states that she has been taking sudafed without much relief.  She has had multiple sick contacts at work.     She states that she is 15 months postpartum and just now her hair is falling out.  She states that she is having trouble losing weight.  She states that she just doesn't feel right.  She states that she has pain in her joints. She just finished weaning from breastfeeding.  She states that she has been having charley horses in her legs.  She denies heat or cold intolerance.  She does have nausea at times.  She denies any vomiting.      Past Medical History:   Diagnosis Date    Abdominal pain     Abnormal Pap smear of cervix 2018    with Dr Kamara    Anxiety     Esophageal spasm     Genital herpes 2014    as of 2015 / no active breakout    GERD (gastroesophageal reflux disease)     History of PCOS     Hypogonadotropic hypogonadism syndrome, female ([facility]) 2014    Exercise induced.      Past Surgical History:   Procedure Laterality Date    BLADDER SURGERY       SECTION N/A 2019     SECTION performed by Angel Kamara MD at [facility] OR     SECTION N/A 2022     SECTION performed by Charles Wooten MD at [facility]    ENDOSCOPY, COLON, DIAGNOSTIC  2015    LEEP  2023    colposcopy, LEEP, ECC, fulgeration left vulvar dysplasia      Family History   Problem Relation Age of Onset    Anxiety Disorder Mother     Anxiety Disorder Father     Prostate Cancer Father     High Cholesterol Father     Melanoma Father     Anxiety Disorder Sister     Melanoma Maternal Grandmother     Cancer Maternal Grandfather     Heart Failure Maternal

## 2024-04-11 LAB
ANTI-NUCLEAR ANTIBODY (ANA): NEGATIVE
SEDIMENTATION RATE, ERYTHROCYTE: 16 MM/HR (ref 0–20)
T4 FREE: 1.2 NG/DL (ref 0.9–1.7)

## 2024-05-28 ENCOUNTER — OFFICE VISIT (OUTPATIENT)
Dept: BARIATRICS/WEIGHT MGMT | Age: 34
End: 2024-05-28
Payer: COMMERCIAL

## 2024-05-28 VITALS
DIASTOLIC BLOOD PRESSURE: 70 MMHG | HEIGHT: 65 IN | BODY MASS INDEX: 36.62 KG/M2 | SYSTOLIC BLOOD PRESSURE: 111 MMHG | TEMPERATURE: 98 F | WEIGHT: 219.8 LBS | HEART RATE: 90 BPM

## 2024-05-28 DIAGNOSIS — E66.09 CLASS 2 OBESITY DUE TO EXCESS CALORIES WITH BODY MASS INDEX (BMI) OF 36.0 TO 36.9 IN ADULT, UNSPECIFIED WHETHER SERIOUS COMORBIDITY PRESENT: ICD-10-CM

## 2024-05-28 DIAGNOSIS — K21.9 GASTROESOPHAGEAL REFLUX DISEASE, UNSPECIFIED WHETHER ESOPHAGITIS PRESENT: Primary | ICD-10-CM

## 2024-05-28 PROCEDURE — 99202 OFFICE O/P NEW SF 15 MIN: CPT

## 2024-05-28 PROCEDURE — G8427 DOCREV CUR MEDS BY ELIG CLIN: HCPCS | Performed by: CLINICAL NURSE SPECIALIST

## 2024-05-28 PROCEDURE — 99205 OFFICE O/P NEW HI 60 MIN: CPT | Performed by: CLINICAL NURSE SPECIALIST

## 2024-05-28 PROCEDURE — G8417 CALC BMI ABV UP PARAM F/U: HCPCS | Performed by: CLINICAL NURSE SPECIALIST

## 2024-05-28 PROCEDURE — 1036F TOBACCO NON-USER: CPT | Performed by: CLINICAL NURSE SPECIALIST

## 2024-05-28 RX ORDER — PHENTERMINE HYDROCHLORIDE 37.5 MG/1
TABLET ORAL
Qty: 30 TABLET | Refills: 0 | Status: SHIPPED | OUTPATIENT
Start: 2024-05-28 | End: 2024-06-28

## 2024-05-28 ASSESSMENT — ENCOUNTER SYMPTOMS
COUGH: 0
BACK PAIN: 1
CONSTIPATION: 1
NAUSEA: 0
DIARRHEA: 0
SHORTNESS OF BREATH: 0
VOMITING: 0

## 2024-05-28 NOTE — PROGRESS NOTES
CC -   GERD, Obesity    BACKGROUND -   First visit: 05/28/2024    Obesity   Began about 1.5 years ago after the birth of son  Initial BMI 31.57, Wt 219.8 lbs Ht 5' 10 \"  HS Grad wt 180 lbs   Lowest   wt 160 lbs   Highest  wt 260 lbs  Pattern of wt gain:  gains 5-10 lbs at a time  Wt change past yr: no change      Plateau  at 220 lbs   Most wt lost: 60 lbs weight lifting, calorie conscious  Other diets attempted: WW,  OTC diet pills,     Desire to lose weight: 10/10  Problem posed by appetite: 5/10 Emotional eater     Initial Diet:    Number of meals per day - 3    Number of snacks per day - 3    Meal volume - 7\" plate, no seconds    Fast food/convenience store - 1 x/week    Restaurants (not fast food) - 0 x/week   Sweets - 7 d/week chocolate chips, ice cream DQ Chilango blizzard small   Chips - 0 d/week   Crackers/pretzels - 0 d/week   Nuts - 0 d/week   Peanut Butter - 2-3 d/week 1 tbsp    Popcorn - 0 d/week   Dried fruit - 0 d/week   Whole fruit - 7 d/week   Breakfast cereal - 0 d/week    Granola/Protein/Energy bar - 0 d/week   Sugar sweetened beverages - none    Protein - Protein shakes     Fiber - No supplements   Multivitamin -daily     Exercise:    Gym membership - yes     Walking - yes Phys   15, 000 steps     Running - no     Resistance - yes     Aerobic class - yes  timbata  _________________________________    [facility] -  Past Medical History:   Diagnosis Date    Abdominal pain     Abnormal Pap smear of cervix 2018    with Dr Kamara    Anxiety     Esophageal spasm     Genital herpes 12/17/2014    as of 11/25/2015 / no active breakout    GERD (gastroesophageal reflux disease)     History of PCOS     Hypogonadotropic hypogonadism syndrome, female (HCC) 12/17/2014    Exercise induced.     Current Outpatient Medications   Medication Sig Dispense Refill    levonorgestrel (MIRENA, 52 MG,) IUD 52 mg 1 each by IntraUTERine route once      valACYclovir (VALTREX) 500 MG tablet Take 1 tablet by mouth daily 90

## 2024-05-28 NOTE — PATIENT INSTRUCTIONS
Weight   Date   219.8 lbs  05/28/2024    Patient's estimated daily energy need (DEN) = 2341 Conner/d     Weight effect of high risk foods =  PB  238 Conner/week +  Sweets 640 conner + Fast food 150 Conner =1028 Conner/week =147 Conner/day =6% DEN= 15 lbs/year    Talked about various options. Does not want VLCD. Would like portion controlled low calorie diet as detailed below. Would like an appetite suppressant. Was previously on topiramate it made her depressed. , and after talking about options and side effects, opted for Phentermine.      Assessment - Uncontrolled      Plan -     Patient Instructions:    Take phentermine 37.5 mg, one-half to one tablet daily as needed for appetite suppression. Take each dose 30-90 min before effect will be needed.    While taking phentermine, check the Blood Pressure every morning and every evening.     If the systolic BP is >155 mmHg, the diastolic BP is >90 mm/Hg or the heart rate is > 100 beats per minute, do not take phentermine that day.    If the systolic BP is consistently >155 mmHg, the diastolic BP is consistently above 90 mm/Hg or the heart rate is consistently > 100 beats per minute, then stop taking phentermine altogether.    If the systolic BP >170 mmHg or the diastolic BP is >100 mmHg (even if it is only once), then phentermine should be stopped altogether without proving that any of these are consistently elevated.    Side effects include but are not limited to an increased heart rate, elevated blood pressure, dry mouth, insomnia, constipation and nervousness. Patient verbalized understanding and will stop this medication right away if they experience any of these symptoms.    Rules:  Count every calorie every day  Limit sweets to one day per month  Limit chips/crackers/pretzels/nuts/popcorn to 100 conner/day  Eliminate all sugar sweetened beverages (including fruit juice)  Limit restaurants (including fast food and food from a convenience store) to one time every two weeks while in

## 2024-06-26 ENCOUNTER — OFFICE VISIT (OUTPATIENT)
Dept: BARIATRICS/WEIGHT MGMT | Age: 34
End: 2024-06-26
Payer: COMMERCIAL

## 2024-06-26 VITALS
HEART RATE: 94 BPM | BODY MASS INDEX: 35.72 KG/M2 | SYSTOLIC BLOOD PRESSURE: 112 MMHG | TEMPERATURE: 97.5 F | HEIGHT: 65 IN | DIASTOLIC BLOOD PRESSURE: 71 MMHG | WEIGHT: 214.4 LBS

## 2024-06-26 DIAGNOSIS — E66.09 CLASS 2 OBESITY DUE TO EXCESS CALORIES WITH BODY MASS INDEX (BMI) OF 36.0 TO 36.9 IN ADULT, UNSPECIFIED WHETHER SERIOUS COMORBIDITY PRESENT: ICD-10-CM

## 2024-06-26 DIAGNOSIS — K21.9 GASTROESOPHAGEAL REFLUX DISEASE, UNSPECIFIED WHETHER ESOPHAGITIS PRESENT: Primary | ICD-10-CM

## 2024-06-26 PROCEDURE — 99214 OFFICE O/P EST MOD 30 MIN: CPT | Performed by: CLINICAL NURSE SPECIALIST

## 2024-06-26 PROCEDURE — 1036F TOBACCO NON-USER: CPT | Performed by: CLINICAL NURSE SPECIALIST

## 2024-06-26 PROCEDURE — 99211 OFF/OP EST MAY X REQ PHY/QHP: CPT

## 2024-06-26 PROCEDURE — G8417 CALC BMI ABV UP PARAM F/U: HCPCS | Performed by: CLINICAL NURSE SPECIALIST

## 2024-06-26 PROCEDURE — G8428 CUR MEDS NOT DOCUMENT: HCPCS | Performed by: CLINICAL NURSE SPECIALIST

## 2024-06-26 RX ORDER — PHENTERMINE HYDROCHLORIDE 37.5 MG/1
TABLET ORAL
Qty: 30 TABLET | Refills: 0 | Status: SHIPPED | OUTPATIENT
Start: 2024-06-26 | End: 2024-07-26

## 2024-06-26 ASSESSMENT — ENCOUNTER SYMPTOMS
CONSTIPATION: 0
SHORTNESS OF BREATH: 0
NAUSEA: 0
VOMITING: 0

## 2024-06-26 NOTE — PATIENT INSTRUCTIONS
and to be organic. However, even such verifications as these may still be untrustworthy.)    Make sure fiber intake is at least 22 grams/day. Do this in part or whole by taking 12 tablespoons of [employer] Fiber One original, plain cereal (or Linn's All Bran Buds cereal) or 4 tablespoons of wheat dextrin powder (Benefiber or generic brand). For both of these, start with 1/8th - 1/4th the target amount and every week add another 1/8th - 1/4th until reaching the target).  Also, fiber gummies containing inulin (such as Nature Made, Joy, Benefiber) or Fiber Choice Pre-biotic tablets containing inulin are options. 1 cup of beans or peas is an excellent food source of fiber. Low calorie, high fiber bread products containing modified wheat starch can be used. An example is the [employer] Xtreme Wellness High Fiber Carb Lean tortilla (7grams in 30 calories).  All of these fiber supplements are for the health of the colon. Their purpose is not to prevent or treat constipation.      Drink at least 64 oz of water each day  Take one multivitamin every day    Targets:  Limit calorie intake to 1600 calories/day  Walk 30 minutes daily  Avoid eating 2 hours within bedtime.     Tips:  Do not eat outside of the dining room or the kitchen  Do not eat while watching TV, videos, working on the computer or using a smart phone  Do not eat food out of a multi-serving bag or container.  Establish 6 hours of food-free \"time-out\" periods (times you don't eat) each day. No period can be less than 1 hour long. The periods need to be the same every day for days that are the same (for example, workdays would have one set of food free periods and weekends would have another set of days). These six hours are in addition to the two hours before bedtime and the time spent sleeping.    Food Resources :  Protein options (20-30 grams protein): 1 cup of low fat cottage cheese (180 kelly/20 grams protein), 6 egg whites + 1 whole egg (170

## 2024-06-26 NOTE — PROGRESS NOTES
other. Sometimes they give verification for their claims to be GMO and gluten free and to be organic. However, even such verifications as these may still be untrustworthy.)    Make sure fiber intake is at least 22 grams/day. Do this in part or whole by taking 12 tablespoons of [employer]'s Fiber One original, plain cereal (or Daya's All Bran Buds cereal) or 4 tablespoons of wheat dextrin powder (Benefiber or generic brand). For both of these, start with 1/8th - 1/4th the target amount and every week add another 1/8th - 1/4th until reaching the target).  Also, fiber gummies containing inulin (such as Nature Made, Joy, Benefiber) or Fiber Choice Pre-biotic tablets containing inulin are options. 1 cup of beans or peas is an excellent food source of fiber. Low calorie, high fiber bread products containing modified wheat starch can be used. An example is the [employer] Xtreme Wellness High Fiber Carb Lean tortilla (7grams in 30 calories).  All of these fiber supplements are for the health of the colon. Their purpose is not to prevent or treat constipation.      Drink at least 64 oz of water each day  Take one multivitamin every day    Targets:  Limit calorie intake to 1600 calories/day  Walk 30 minutes daily  Avoid eating 2 hours within bedtime.     Tips:  Do not eat outside of the dining room or the kitchen  Do not eat while watching TV, videos, working on the computer or using a smart phone  Do not eat food out of a multi-serving bag or container.  Establish 6 hours of food-free \"time-out\" periods (times you don't eat) each day. No period can be less than 1 hour long. The periods need to be the same every day for days that are the same (for example, workdays would have one set of food free periods and weekends would have another set of days). These six hours are in addition to the two hours before bedtime and the time spent sleeping.    Food Resources :  Protein options (20-30 grams protein): 1 cup of

## 2024-07-29 ENCOUNTER — OFFICE VISIT (OUTPATIENT)
Dept: BARIATRICS/WEIGHT MGMT | Age: 34
End: 2024-07-29
Payer: COMMERCIAL

## 2024-07-29 VITALS
HEART RATE: 88 BPM | SYSTOLIC BLOOD PRESSURE: 101 MMHG | TEMPERATURE: 97.9 F | BODY MASS INDEX: 29.52 KG/M2 | WEIGHT: 206.2 LBS | DIASTOLIC BLOOD PRESSURE: 63 MMHG | HEIGHT: 70 IN

## 2024-07-29 DIAGNOSIS — K21.9 GASTROESOPHAGEAL REFLUX DISEASE, UNSPECIFIED WHETHER ESOPHAGITIS PRESENT: Primary | ICD-10-CM

## 2024-07-29 DIAGNOSIS — E66.09 CLASS 2 OBESITY DUE TO EXCESS CALORIES WITH BODY MASS INDEX (BMI) OF 36.0 TO 36.9 IN ADULT, UNSPECIFIED WHETHER SERIOUS COMORBIDITY PRESENT: ICD-10-CM

## 2024-07-29 PROCEDURE — G8428 CUR MEDS NOT DOCUMENT: HCPCS | Performed by: CLINICAL NURSE SPECIALIST

## 2024-07-29 PROCEDURE — 1036F TOBACCO NON-USER: CPT | Performed by: CLINICAL NURSE SPECIALIST

## 2024-07-29 PROCEDURE — G8417 CALC BMI ABV UP PARAM F/U: HCPCS | Performed by: CLINICAL NURSE SPECIALIST

## 2024-07-29 PROCEDURE — 99214 OFFICE O/P EST MOD 30 MIN: CPT | Performed by: CLINICAL NURSE SPECIALIST

## 2024-07-29 PROCEDURE — 99211 OFF/OP EST MAY X REQ PHY/QHP: CPT

## 2024-07-29 RX ORDER — PHENTERMINE HYDROCHLORIDE 37.5 MG/1
TABLET ORAL
Qty: 30 TABLET | Refills: 0 | Status: SHIPPED | OUTPATIENT
Start: 2024-07-29 | End: 2024-08-29

## 2024-07-29 RX ORDER — MULTIVIT-MIN/IRON/FOLIC ACID/K 18-600-40
CAPSULE ORAL
COMMUNITY

## 2024-07-29 ASSESSMENT — ENCOUNTER SYMPTOMS
NAUSEA: 0
SHORTNESS OF BREATH: 0
CONSTIPATION: 0
VOMITING: 0

## 2024-07-29 NOTE — PROGRESS NOTES
CC -   GERD, Obesity    BACKGROUND -   First visit: 06/26/2024  First visit: 05/28/2024    Obesity   Began about 1.5 years ago after the birth of son  Initial BMI 31.57, Wt 219.8 lbs Ht 5' 10 \"  HS Grad wt 180 lbs   Lowest   wt 160 lbs   Highest  wt 260 lbs  Pattern of wt gain:  gains 5-10 lbs at a time  Wt change past yr: no change      Plateau  at 220 lbs   Most wt lost: 60 lbs weight lifting, calorie conscious  Other diets attempted: WW,  OTC diet pills,     Desire to lose weight: 10/10  Problem posed by appetite: 5/10 Emotional eater     Initial Diet:    Number of meals per day - 3    Number of snacks per day - 3    Meal volume - 7\" plate, no seconds    Fast food/convenience store - 1 x/week    Restaurants (not fast food) - 0 x/week   Sweets - 7 d/week chocolate chips, ice cream DQ [place] blizzard small   Chips - 0 d/week   Crackers/pretzels - 0 d/week   Nuts - 0 d/week   Peanut Butter - 2-3 d/week 1 tbsp    Popcorn - 0 d/week   Dried fruit - 0 d/week   Whole fruit - 7 d/week   Breakfast cereal - 0 d/week    Granola/Protein/Energy bar - 0 d/week   Sugar sweetened beverages - none    Protein - Protein shakes     Fiber - No supplements   Multivitamin -daily     Exercise:    Gym membership - yes     Walking - yes Phys   15, 000 steps     Running - no     Resistance - yes     Aerobic class - yes  [facility]  _________________________________    [facility] -  Past Medical History:   Diagnosis Date    Abdominal pain     Abnormal Pap smear of cervix 2018    with Dr Kamara    Anxiety     Esophageal spasm     Genital herpes 12/17/2014    as of 11/25/2015 / no active breakout    GERD (gastroesophageal reflux disease)     History of PCOS     Hypogonadotropic hypogonadism syndrome, female (HCC) 12/17/2014    Exercise induced.     Current Outpatient Medications   Medication Sig Dispense Refill    phentermine (ADIPEX-P) 37.5 MG tablet Take phentermine 37.5 mg, one-half to one tablet daily  Take each dose 30-90 min before

## 2024-08-29 ENCOUNTER — OFFICE VISIT (OUTPATIENT)
Dept: BARIATRICS/WEIGHT MGMT | Age: 34
End: 2024-08-29
Payer: COMMERCIAL

## 2024-08-29 VITALS
BODY MASS INDEX: 28.75 KG/M2 | SYSTOLIC BLOOD PRESSURE: 108 MMHG | DIASTOLIC BLOOD PRESSURE: 58 MMHG | HEIGHT: 70 IN | WEIGHT: 200.8 LBS | HEART RATE: 90 BPM | TEMPERATURE: 97.2 F

## 2024-08-29 DIAGNOSIS — K21.9 GASTROESOPHAGEAL REFLUX DISEASE, UNSPECIFIED WHETHER ESOPHAGITIS PRESENT: Primary | ICD-10-CM

## 2024-08-29 DIAGNOSIS — E66.09 CLASS 2 OBESITY DUE TO EXCESS CALORIES WITH BODY MASS INDEX (BMI) OF 36.0 TO 36.9 IN ADULT, UNSPECIFIED WHETHER SERIOUS COMORBIDITY PRESENT: ICD-10-CM

## 2024-08-29 PROCEDURE — 1036F TOBACCO NON-USER: CPT | Performed by: CLINICAL NURSE SPECIALIST

## 2024-08-29 PROCEDURE — 99214 OFFICE O/P EST MOD 30 MIN: CPT | Performed by: CLINICAL NURSE SPECIALIST

## 2024-08-29 PROCEDURE — G8417 CALC BMI ABV UP PARAM F/U: HCPCS | Performed by: CLINICAL NURSE SPECIALIST

## 2024-08-29 PROCEDURE — G8428 CUR MEDS NOT DOCUMENT: HCPCS | Performed by: CLINICAL NURSE SPECIALIST

## 2024-08-29 PROCEDURE — 99211 OFF/OP EST MAY X REQ PHY/QHP: CPT

## 2024-08-29 RX ORDER — PHENTERMINE HYDROCHLORIDE 37.5 MG/1
TABLET ORAL
Qty: 30 TABLET | Refills: 0 | Status: SHIPPED | OUTPATIENT
Start: 2024-08-29 | End: 2024-09-29

## 2024-08-29 ASSESSMENT — ENCOUNTER SYMPTOMS
CONSTIPATION: 0
SHORTNESS OF BREATH: 0
VOMITING: 0
NAUSEA: 0

## 2024-08-29 NOTE — PROGRESS NOTES
CC -   GERD, Obesity    BACKGROUND -   First visit: 07/29/2024  First visit: 05/28/2024    Obesity   Began about 1.5 years ago after the birth of son  Initial BMI 31.57, Wt 219.8 lbs Ht 5' 10 \"  HS Grad wt 180 lbs   Lowest   wt 160 lbs   Highest  wt 260 lbs  Pattern of wt gain:  gains 5-10 lbs at a time  Wt change past yr: no change      Plateau  at 220 lbs   Most wt lost: 60 lbs weight lifting, calorie conscious  Other diets attempted: WW,  OTC diet pills,     Desire to lose weight: 10/10  Problem posed by appetite: 5/10 Emotional eater     Initial Diet:    Number of meals per day - 3    Number of snacks per day - 3    Meal volume - 7\" plate, no seconds    Fast food/convenience store - 1 x/week    Restaurants (not fast food) - 0 x/week   Sweets - 7 d/week chocolate chips, ice cream DQ [place] blizzard small   Chips - 0 d/week   Crackers/pretzels - 0 d/week   Nuts - 0 d/week   Peanut Butter - 2-3 d/week 1 tbsp    Popcorn - 0 d/week   Dried fruit - 0 d/week   Whole fruit - 7 d/week   Breakfast cereal - 0 d/week    Granola/Protein/Energy bar - 0 d/week   Sugar sweetened beverages - none    Protein - Protein shakes     Fiber - No supplements   Multivitamin -daily     Exercise:    Gym membership - yes     Walking - yes Phys   15, 000 steps     Running - no     Resistance - yes     Aerobic class - yes  [facility]  _________________________________    [facility] -  Past Medical History:   Diagnosis Date    Abdominal pain     Abnormal Pap smear of cervix 2018    with Dr Kamara    Anxiety     Esophageal spasm     Genital herpes 12/17/2014    as of 11/25/2015 / no active breakout    GERD (gastroesophageal reflux disease)     History of PCOS     Hypogonadotropic hypogonadism syndrome, female (HCC) 12/17/2014    Exercise induced.     Current Outpatient Medications   Medication Sig Dispense Refill    phentermine (ADIPEX-P) 37.5 MG tablet Take phentermine 37.5 mg, one-half to one tablet daily  Take each dose 30-90 min before

## 2024-09-26 ENCOUNTER — OFFICE VISIT (OUTPATIENT)
Dept: BARIATRICS/WEIGHT MGMT | Age: 34
End: 2024-09-26
Payer: COMMERCIAL

## 2024-09-26 VITALS
TEMPERATURE: 97.4 F | DIASTOLIC BLOOD PRESSURE: 65 MMHG | WEIGHT: 197.6 LBS | BODY MASS INDEX: 28.29 KG/M2 | HEART RATE: 79 BPM | SYSTOLIC BLOOD PRESSURE: 106 MMHG | HEIGHT: 70 IN

## 2024-09-26 DIAGNOSIS — E66.09 CLASS 2 OBESITY DUE TO EXCESS CALORIES WITH BODY MASS INDEX (BMI) OF 36.0 TO 36.9 IN ADULT, UNSPECIFIED WHETHER SERIOUS COMORBIDITY PRESENT: ICD-10-CM

## 2024-09-26 DIAGNOSIS — K21.9 GASTROESOPHAGEAL REFLUX DISEASE, UNSPECIFIED WHETHER ESOPHAGITIS PRESENT: Primary | ICD-10-CM

## 2024-09-26 PROCEDURE — 1036F TOBACCO NON-USER: CPT | Performed by: CLINICAL NURSE SPECIALIST

## 2024-09-26 PROCEDURE — G8427 DOCREV CUR MEDS BY ELIG CLIN: HCPCS | Performed by: CLINICAL NURSE SPECIALIST

## 2024-09-26 PROCEDURE — 99211 OFF/OP EST MAY X REQ PHY/QHP: CPT

## 2024-09-26 PROCEDURE — G8417 CALC BMI ABV UP PARAM F/U: HCPCS | Performed by: CLINICAL NURSE SPECIALIST

## 2024-09-26 PROCEDURE — 99214 OFFICE O/P EST MOD 30 MIN: CPT | Performed by: CLINICAL NURSE SPECIALIST

## 2024-09-26 RX ORDER — PHENTERMINE HYDROCHLORIDE 37.5 MG/1
TABLET ORAL
Qty: 30 TABLET | Refills: 0 | Status: SHIPPED | OUTPATIENT
Start: 2024-09-26 | End: 2024-10-26

## 2024-09-26 ASSESSMENT — ENCOUNTER SYMPTOMS
CONSTIPATION: 0
NAUSEA: 0
VOMITING: 0
SHORTNESS OF BREATH: 0

## 2024-10-29 ENCOUNTER — OFFICE VISIT (OUTPATIENT)
Dept: BARIATRICS/WEIGHT MGMT | Age: 34
End: 2024-10-29
Payer: COMMERCIAL

## 2024-10-29 VITALS
SYSTOLIC BLOOD PRESSURE: 105 MMHG | TEMPERATURE: 98.7 F | HEIGHT: 70 IN | WEIGHT: 199.6 LBS | DIASTOLIC BLOOD PRESSURE: 58 MMHG | BODY MASS INDEX: 28.58 KG/M2 | HEART RATE: 98 BPM

## 2024-10-29 DIAGNOSIS — K21.9 GASTROESOPHAGEAL REFLUX DISEASE, UNSPECIFIED WHETHER ESOPHAGITIS PRESENT: Primary | ICD-10-CM

## 2024-10-29 DIAGNOSIS — E66.812 CLASS 2 OBESITY DUE TO EXCESS CALORIES WITH BODY MASS INDEX (BMI) OF 36.0 TO 36.9 IN ADULT, UNSPECIFIED WHETHER SERIOUS COMORBIDITY PRESENT: ICD-10-CM

## 2024-10-29 DIAGNOSIS — E66.09 CLASS 2 OBESITY DUE TO EXCESS CALORIES WITH BODY MASS INDEX (BMI) OF 36.0 TO 36.9 IN ADULT, UNSPECIFIED WHETHER SERIOUS COMORBIDITY PRESENT: ICD-10-CM

## 2024-10-29 PROCEDURE — G8428 CUR MEDS NOT DOCUMENT: HCPCS | Performed by: CLINICAL NURSE SPECIALIST

## 2024-10-29 PROCEDURE — 1036F TOBACCO NON-USER: CPT | Performed by: CLINICAL NURSE SPECIALIST

## 2024-10-29 PROCEDURE — 99211 OFF/OP EST MAY X REQ PHY/QHP: CPT

## 2024-10-29 PROCEDURE — G8484 FLU IMMUNIZE NO ADMIN: HCPCS | Performed by: CLINICAL NURSE SPECIALIST

## 2024-10-29 PROCEDURE — 99213 OFFICE O/P EST LOW 20 MIN: CPT | Performed by: CLINICAL NURSE SPECIALIST

## 2024-10-29 PROCEDURE — G8417 CALC BMI ABV UP PARAM F/U: HCPCS | Performed by: CLINICAL NURSE SPECIALIST

## 2024-10-29 ASSESSMENT — ENCOUNTER SYMPTOMS
CONSTIPATION: 0
SHORTNESS OF BREATH: 0
VOMITING: 0
NAUSEA: 0

## 2024-10-29 NOTE — PROGRESS NOTES
CC -   GERD, Obesity    BACKGROUND -   First visit: 09/26/2024  First visit: 05/28/2024    Obesity   Began about 1.5 years ago after the birth of son  Initial BMI 31.57, Wt 219.8 lbs Ht 5' 10 \"  HS Grad wt 180 lbs   Lowest   wt 160 lbs   Highest  wt 260 lbs  Pattern of wt gain:  gains 5-10 lbs at a time  Wt change past yr: no change      Plateau  at 220 lbs   Most wt lost: 60 lbs weight lifting, calorie conscious  Other diets attempted: WW,  OTC diet pills,     Desire to lose weight: 10/10  Problem posed by appetite: 5/10 Emotional eater     Initial Diet:    Number of meals per day - 3    Number of snacks per day - 3    Meal volume - 7\" plate, no seconds    Fast food/convenience store - 1 x/week    Restaurants (not fast food) - 0 x/week   Sweets - 7 d/week chocolate chips, ice cream DQ [place] blizzard small   Chips - 0 d/week   Crackers/pretzels - 0 d/week   Nuts - 0 d/week   Peanut Butter - 2-3 d/week 1 tbsp    Popcorn - 0 d/week   Dried fruit - 0 d/week   Whole fruit - 7 d/week   Breakfast cereal - 0 d/week    Granola/Protein/Energy bar - 0 d/week   Sugar sweetened beverages - none    Protein - Protein shakes     Fiber - No supplements   Multivitamin -daily     Exercise:    Gym membership - yes     Walking - yes Phys   15, 000 steps     Running - no     Resistance - yes     Aerobic class - yes  timbata  _________________________________    [facility] -  Past Medical History:   Diagnosis Date    Abdominal pain     Abnormal Pap smear of cervix 2018    with Dr Kamara    Anxiety     Esophageal spasm     Genital herpes 12/17/2014    as of 11/25/2015 / no active breakout    GERD (gastroesophageal reflux disease)     History of PCOS     Hypogonadotropic hypogonadism syndrome, female (HCC) 12/17/2014    Exercise induced.     Current Outpatient Medications   Medication Sig Dispense Refill    Cholecalciferol (VITAMIN D) 50 MCG (2000 UT) CAPS capsule Take by mouth      levonorgestrel (MIRENA, 52 MG,) IUD 52 mg 1 each by

## 2024-10-29 NOTE — PATIENT INSTRUCTIONS
Patient Instructions:    Rules:  Count every calorie every day  Limit sweets to one day per month  Limit chips/crackers/pretzels/nuts/popcorn to 100 kelly/day  Eliminate all sugar sweetened beverages (including fruit juice)  Limit restaurants (including fast food and food from a convenience store) to one time every two weeks while in town    Requirements:  Make sure protein intake is at least 94 grams per day (do not count protein every day; instead spot check your intake every 2-3 weeks and make sure what you think you are getting is close to accurate; consider using a protein shake if needed; these are in the pharmacy section of the stores, not the grocery section; Premier, Pure Protein and Fairlife are relatively inexpensive and taste good to most patients; other options are Nectar, Boost Max, Ensure Max, BeneProtein and [employer] lean (which is lactose-free);   Nectar fruit, Premier Protein Clear, IsoPure Protein Drink, and Protein 2 O are water-based options; Quest (or [employer], which is cheaper and is ordered on [employer]) and the [employer] protein bars can also be used, but have less protein in them )    (Disclaimer: Dietary supplements rarely have their listed ingredients and the amount of each verified by a third party other. Sometimes they give verification for their claims to be GMO and gluten free and to be organic. However, even such verifications as these may still be untrustworthy.)    Make sure fiber intake is at least 22 grams/day. Do this in part or whole by taking 12 tablespoons of [employer] Fiber One original, plain cereal (or [employer]'s All Bran Buds cereal) or 4 tablespoons of wheat dextrin powder (Benefiber or generic brand). For both of these, start with 1/8th - 1/4th the target amount and every week add another 1/8th - 1/4th until reaching the target).  Also, fiber gummies containing inulin (such as Nature Rufino Liu Benefiber) or Fiber Choice Pre-biotic tablets containing inulin are options. 1

## (undated) DEVICE — TUBE BLD COLLECT ST 1 SIL COAT 7ML 10ML

## (undated) DEVICE — CATHETERIZATION KIT FOL16 FR 2000 CC DRAINAGE BG LUBRICATH

## (undated) DEVICE — TOWEL,OR,DSP,ST,BLUE,STD,6/PK,12PK/CS: Brand: MEDLINE

## (undated) DEVICE — TELFA ADHESIVE ISLAND DRESSING: Brand: TELFA

## (undated) DEVICE — APPLICATOR PREP 26ML 0.7% IOD POVACRYLEX 74% ISO ALC ST

## (undated) DEVICE — SUTURE VCRL + SZ 3-0 L27IN ABSRB UD L26MM SH 1/2 CIR VCP416H

## (undated) DEVICE — STAPLER SKIN SQ 30 ABSRB STPL DISP INSORB

## (undated) DEVICE — Device: Brand: PORTEX

## (undated) DEVICE — CESAREAN BIRTH PACK II: Brand: MEDLINE INDUSTRIES, INC.

## (undated) DEVICE — SUTURE CHROMIC GUT SZ 1 L36IN ABSRB BRN L40MM CT 1/2 CIR 915H

## (undated) DEVICE — MEDI-VAC YANKAUER SUCTION HANDLE W/BULBOUS TIP: Brand: CARDINAL HEALTH

## (undated) DEVICE — CONTAINER,SPEC,PNEUM TUBE,3OZ,STRL PATH: Brand: MEDLINE

## (undated) DEVICE — COUNTER NDL 30 COUNT DBL MAG

## (undated) DEVICE — SPONGE LAP W18XL18IN WHT COT 4 PLY FLD STRUNG RADPQ DISP ST

## (undated) DEVICE — ELECTRODE PT RET AD L9FT HI MOIST COND ADH HYDRGEL CORDED

## (undated) DEVICE — TUBING, SUCTION, 3/16" X 12', STRAIGHT: Brand: MEDLINE

## (undated) DEVICE — BLADE SURG NO20 S STL STR DISP GLASSVAN

## (undated) DEVICE — SHEET,DRAPE,53X77,STERILE: Brand: MEDLINE

## (undated) DEVICE — STRIP,CLOSURE,WOUND,MEDI-STRIP,1/2X4: Brand: MEDLINE

## (undated) DEVICE — GOWN,SIRUS,FABRNF,L,20/CS: Brand: MEDLINE

## (undated) DEVICE — PEN: MARKING STD 100/CS: Brand: MEDICAL ACTION INDUSTRIES

## (undated) DEVICE — SUTURE PLN GUT SZ 3-0 L27IN ABSRB YELLOWISH TAN L36MM CT-1 842H

## (undated) DEVICE — PENCIL ES L3M BTTN SWCH HOLSTER W/ BLDE ELECTRD EDGE

## (undated) DEVICE — COVER,LIGHT HANDLE,FLX,2/PK: Brand: MEDLINE INDUSTRIES, INC.

## (undated) DEVICE — HYPODERMIC SAFETY NEEDLE: Brand: MAGELLAN

## (undated) DEVICE — GLOVE SURG SZ 65 L12IN FNGR THK83MIL CRM POLYISOPRENE

## (undated) DEVICE — GLOVE SURG SZ 75 L12IN FNGR THK83MIL CRM POLYISOPRENE

## (undated) DEVICE — SUTURE VCRL + SZ 3-0 L36IN ABSRB UD L36MM CT-1 1/2 CIR VCP944H

## (undated) DEVICE — 3M™ STERI-STRIP™ COMPOUND BENZOIN TINCTURE 40 BAGS/CARTON 4 CARTONS/CASE C1544: Brand: 3M™ STERI-STRIP™

## (undated) DEVICE — SUTURE VCRL + SZ 0 L36IN ABSRB VLT L36MM CT-1 1/2 CIR VCP346H

## (undated) DEVICE — 3000CC GUARDIAN II: Brand: GUARDIAN

## (undated) DEVICE — SUTURE CHROMIC GUT SZ 2-0 L36IN ABSRB BRN L36MM CT-1 1/2 923H

## (undated) DEVICE — SUTURE VCRL + 1 L36IN ABSRB VLT CT-1 L36MM 1/2 CIR VCP347H

## (undated) DEVICE — GOWN,SIRUS,POLYRNF,BRTHSLV,XLN/XXL,18/CS: Brand: MEDLINE

## (undated) DEVICE — CONTAINER SPEC 64OZ POLYPR PATH SNAP LOK CAP W/ LID

## (undated) DEVICE — SUTURE STRATAFIX SYMMETRIC SZ 1 L18IN ABSRB VLT CT1 L36CM SXPP1A404

## (undated) DEVICE — GOWN,SIRUS,POLYRNF,BRTHSLV,XLN/XL,20/CS: Brand: MEDLINE